# Patient Record
Sex: FEMALE | Race: WHITE | NOT HISPANIC OR LATINO | Employment: OTHER | URBAN - METROPOLITAN AREA
[De-identification: names, ages, dates, MRNs, and addresses within clinical notes are randomized per-mention and may not be internally consistent; named-entity substitution may affect disease eponyms.]

---

## 2021-11-09 PROBLEM — K35.32 ACUTE PERFORATED APPENDICITIS: Status: ACTIVE | Noted: 2021-11-09

## 2021-11-09 PROBLEM — K37 APPENDICITIS: Status: ACTIVE | Noted: 2021-11-09

## 2023-10-20 ENCOUNTER — HOSPITAL ENCOUNTER (INPATIENT)
Facility: HOSPITAL | Age: 73
LOS: 2 days | Discharge: HOME | DRG: 064 | End: 2023-10-22
Attending: EMERGENCY MEDICINE | Admitting: PSYCHIATRY & NEUROLOGY
Payer: MEDICARE

## 2023-10-20 DIAGNOSIS — I61.2 NONTRAUMATIC HEMORRHAGE OF LEFT CEREBRAL HEMISPHERE: Primary | ICD-10-CM

## 2023-10-20 DIAGNOSIS — I48.91 ATRIAL FIBRILLATION: ICD-10-CM

## 2023-10-20 PROBLEM — G93.6 VASOGENIC CEREBRAL EDEMA: Status: ACTIVE | Noted: 2023-10-20

## 2023-10-20 PROBLEM — I61.9 ICH (INTRACEREBRAL HEMORRHAGE): Status: ACTIVE | Noted: 2023-10-20

## 2023-10-20 PROBLEM — G93.9 TEMPORAL LOBE LESION: Status: ACTIVE | Noted: 2023-10-20

## 2023-10-20 PROBLEM — I61.1 NONTRAUMATIC CORTICAL HEMORRHAGE OF LEFT CEREBRAL HEMISPHERE: Status: ACTIVE | Noted: 2023-10-20

## 2023-10-20 LAB
ABO + RH BLD: NORMAL
BLD GP AB SCN CELLS X3 SERPL QL: NORMAL
BLD PROD TYP BPU: NORMAL
BLOOD UNIT EXPIRATION DATE: NORMAL
BLOOD UNIT TYPE CODE: 7300
BLOOD UNIT TYPE: NORMAL
CHOLEST SERPL-MCNC: 154 MG/DL (ref 120–199)
CHOLEST SERPL-MCNC: 154 MG/DL (ref 120–199)
CHOLEST/HDLC SERPL: 3 {RATIO} (ref 2–5)
CHOLEST/HDLC SERPL: 3 {RATIO} (ref 2–5)
CODING SYSTEM: NORMAL
CROSSMATCH INTERPRETATION: NORMAL
DISPENSE STATUS: NORMAL
ESTIMATED AVG GLUCOSE: 108 MG/DL (ref 68–131)
ESTIMATED AVG GLUCOSE: 108 MG/DL (ref 68–131)
HBA1C MFR BLD: 5.4 % (ref 4–5.6)
HBA1C MFR BLD: 5.4 % (ref 4–5.6)
HDLC SERPL-MCNC: 52 MG/DL (ref 40–75)
HDLC SERPL-MCNC: 52 MG/DL (ref 40–75)
HDLC SERPL: 33.8 % (ref 20–50)
HDLC SERPL: 33.8 % (ref 20–50)
LDLC SERPL CALC-MCNC: 87.4 MG/DL (ref 63–159)
LDLC SERPL CALC-MCNC: 87.4 MG/DL (ref 63–159)
NONHDLC SERPL-MCNC: 102 MG/DL
NONHDLC SERPL-MCNC: 102 MG/DL
SPECIMEN OUTDATE: NORMAL
TRIGL SERPL-MCNC: 73 MG/DL (ref 30–150)
TRIGL SERPL-MCNC: 73 MG/DL (ref 30–150)
TSH SERPL DL<=0.005 MIU/L-ACNC: 1.67 UIU/ML (ref 0.4–4)
TSH SERPL DL<=0.005 MIU/L-ACNC: 1.67 UIU/ML (ref 0.4–4)
UNIT NUMBER: NORMAL

## 2023-10-20 PROCEDURE — 25000003 PHARM REV CODE 250: Performed by: PHYSICIAN ASSISTANT

## 2023-10-20 PROCEDURE — P9035 PLATELET PHERES LEUKOREDUCED: HCPCS | Performed by: PHYSICIAN ASSISTANT

## 2023-10-20 PROCEDURE — 86901 BLOOD TYPING SEROLOGIC RH(D): CPT | Performed by: EMERGENCY MEDICINE

## 2023-10-20 PROCEDURE — 25000003 PHARM REV CODE 250

## 2023-10-20 PROCEDURE — 99285 EMERGENCY DEPT VISIT HI MDM: CPT | Mod: 25

## 2023-10-20 PROCEDURE — 99223 1ST HOSP IP/OBS HIGH 75: CPT | Mod: ,,, | Performed by: STUDENT IN AN ORGANIZED HEALTH CARE EDUCATION/TRAINING PROGRAM

## 2023-10-20 PROCEDURE — 99223 PR INITIAL HOSPITAL CARE,LEVL III: ICD-10-PCS | Mod: ,,, | Performed by: STUDENT IN AN ORGANIZED HEALTH CARE EDUCATION/TRAINING PROGRAM

## 2023-10-20 PROCEDURE — 80061 LIPID PANEL: CPT

## 2023-10-20 PROCEDURE — 83036 HEMOGLOBIN GLYCOSYLATED A1C: CPT

## 2023-10-20 PROCEDURE — 99223 PR INITIAL HOSPITAL CARE,LEVL III: ICD-10-PCS | Mod: ,,, | Performed by: PSYCHIATRY & NEUROLOGY

## 2023-10-20 PROCEDURE — 84443 ASSAY THYROID STIM HORMONE: CPT

## 2023-10-20 PROCEDURE — 63600175 PHARM REV CODE 636 W HCPCS: Mod: JG | Performed by: PHYSICIAN ASSISTANT

## 2023-10-20 PROCEDURE — 20000000 HC ICU ROOM

## 2023-10-20 PROCEDURE — 99223 1ST HOSP IP/OBS HIGH 75: CPT | Mod: ,,, | Performed by: PSYCHIATRY & NEUROLOGY

## 2023-10-20 PROCEDURE — 96365 THER/PROPH/DIAG IV INF INIT: CPT

## 2023-10-20 PROCEDURE — 36430 TRANSFUSION BLD/BLD COMPNT: CPT

## 2023-10-20 PROCEDURE — 25500020 PHARM REV CODE 255: Performed by: PSYCHIATRY & NEUROLOGY

## 2023-10-20 PROCEDURE — 63600175 PHARM REV CODE 636 W HCPCS: Performed by: PHYSICIAN ASSISTANT

## 2023-10-20 RX ORDER — LABETALOL HCL 20 MG/4 ML
10 SYRINGE (ML) INTRAVENOUS
Status: DISCONTINUED | OUTPATIENT
Start: 2023-10-20 | End: 2023-10-21

## 2023-10-20 RX ORDER — POLYETHYLENE GLYCOL 3350 17 G/17G
17 POWDER, FOR SOLUTION ORAL DAILY
Status: DISCONTINUED | OUTPATIENT
Start: 2023-10-21 | End: 2023-10-22 | Stop reason: HOSPADM

## 2023-10-20 RX ORDER — NICARDIPINE HYDROCHLORIDE 0.2 MG/ML
0-15 INJECTION INTRAVENOUS CONTINUOUS
Status: DISCONTINUED | OUTPATIENT
Start: 2023-10-20 | End: 2023-10-21

## 2023-10-20 RX ORDER — ONDANSETRON 2 MG/ML
4 INJECTION INTRAMUSCULAR; INTRAVENOUS EVERY 8 HOURS PRN
Status: DISCONTINUED | OUTPATIENT
Start: 2023-10-20 | End: 2023-10-22 | Stop reason: HOSPADM

## 2023-10-20 RX ORDER — SODIUM,POTASSIUM PHOSPHATES 280-250MG
2 POWDER IN PACKET (EA) ORAL
Status: DISCONTINUED | OUTPATIENT
Start: 2023-10-20 | End: 2023-10-22 | Stop reason: HOSPADM

## 2023-10-20 RX ORDER — ACETAMINOPHEN 325 MG/1
650 TABLET ORAL EVERY 6 HOURS PRN
Status: DISCONTINUED | OUTPATIENT
Start: 2023-10-20 | End: 2023-10-22 | Stop reason: HOSPADM

## 2023-10-20 RX ORDER — LANOLIN ALCOHOL/MO/W.PET/CERES
800 CREAM (GRAM) TOPICAL
Status: DISCONTINUED | OUTPATIENT
Start: 2023-10-20 | End: 2023-10-22 | Stop reason: HOSPADM

## 2023-10-20 RX ORDER — SODIUM CHLORIDE 9 MG/ML
INJECTION, SOLUTION INTRAVENOUS CONTINUOUS
Status: DISCONTINUED | OUTPATIENT
Start: 2023-10-20 | End: 2023-10-21

## 2023-10-20 RX ORDER — AMOXICILLIN 250 MG
1 CAPSULE ORAL 2 TIMES DAILY
Status: DISCONTINUED | OUTPATIENT
Start: 2023-10-20 | End: 2023-10-22 | Stop reason: HOSPADM

## 2023-10-20 RX ORDER — LEVETIRACETAM 500 MG/5ML
500 INJECTION, SOLUTION, CONCENTRATE INTRAVENOUS EVERY 12 HOURS
Status: DISCONTINUED | OUTPATIENT
Start: 2023-10-20 | End: 2023-10-21

## 2023-10-20 RX ORDER — HYDROCODONE BITARTRATE AND ACETAMINOPHEN 500; 5 MG/1; MG/1
TABLET ORAL
Status: DISCONTINUED | OUTPATIENT
Start: 2023-10-20 | End: 2023-10-22 | Stop reason: HOSPADM

## 2023-10-20 RX ORDER — SODIUM CHLORIDE 0.9 % (FLUSH) 0.9 %
10 SYRINGE (ML) INJECTION
Status: DISCONTINUED | OUTPATIENT
Start: 2023-10-20 | End: 2023-10-22 | Stop reason: HOSPADM

## 2023-10-20 RX ADMIN — SODIUM CHLORIDE 500 ML: 9 INJECTION, SOLUTION INTRAVENOUS at 05:10

## 2023-10-20 RX ADMIN — ACETAMINOPHEN 650 MG: 325 TABLET ORAL at 09:10

## 2023-10-20 RX ADMIN — LEVETIRACETAM 500 MG: 100 INJECTION INTRAVENOUS at 09:10

## 2023-10-20 RX ADMIN — DESMOPRESSIN ACETATE 22.4 MCG: 4 SOLUTION INTRAVENOUS at 03:10

## 2023-10-20 RX ADMIN — SODIUM CHLORIDE: 0.9 INJECTION, SOLUTION INTRAVENOUS at 07:10

## 2023-10-20 RX ADMIN — SENNOSIDES AND DOCUSATE SODIUM 1 TABLET: 50; 8.6 TABLET ORAL at 09:10

## 2023-10-20 RX ADMIN — IOHEXOL 75 ML: 350 INJECTION, SOLUTION INTRAVENOUS at 05:10

## 2023-10-20 NOTE — HPI
Jody Muñoz is a 73 y.o. patient with past medical history of hypothyroidism on synthroid and on aspirin without clear medical indication. Presented to [facility] for a 2-day history of expressive aphasia and persistent pressure-like headaches and recent memory problems. Patient was unable to write or recall her family members names. As per  at bedside, no trauma or fall. Patient denies visual disturbances or weakness. CT head with posterior aspect of the left temporal lobe consistent with intraparenchymal hemorrhage and surrounding edema. No midline shift. Vital signs stable upon admission. On physical exam patient with expressive aphasia. Strength 5/5, no drift. CTA and brain MRI upon admission pending.    Patient will be admitted to Neuro ICU for further management and treatment.

## 2023-10-20 NOTE — SUBJECTIVE & OBJECTIVE
Past Medical History:   Diagnosis Date    Temporal lobe lesion 10/20/2023    Thyroid disease      Past Surgical History:   Procedure Laterality Date    C SECTION      LAPAROSCOPIC APPENDECTOMY N/A 11/9/2021    Procedure: APPENDECTOMY, LAPAROSCOPIC;  Surgeon: Donald James MD;  Location: [facility];  Service: General;  Laterality: N/A;      No current facility-administered medications on file prior to encounter.     Current Outpatient Medications on File Prior to Encounter   Medication Sig Dispense Refill    aspirin (ECOTRIN) 81 MG EC tablet Take 81 mg by mouth once daily.      levothyroxine (SYNTHROID) 50 MCG tablet Take 50 mcg by mouth before breakfast.      [DISCONTINUED] ciprofloxacin HCl (CIPRO) 500 MG tablet Take 1 tablet (500 mg total) by mouth 2 (two) times daily. 20 tablet 0    [DISCONTINUED] HYDROcodone-acetaminophen (NORCO) 7.5-325 mg per tablet Take 1 tablet by mouth every 4 (four) hours as needed. 41 tablet 0      Allergies: Patient has no known allergies.    N/A  Family history is reviewed and has not changed   Social History     Tobacco Use    Smoking status: Never    Smokeless tobacco: Never   Substance Use Topics    Alcohol use: Not Currently    Drug use: Never     Review of Systems   Constitutional:  Negative for activity change and appetite change.   HENT:  Negative for drooling and trouble swallowing.    Eyes:  Negative for photophobia.   Respiratory:  Negative for cough and shortness of breath.    Cardiovascular:  Negative for chest pain and leg swelling.   Gastrointestinal:  Negative for abdominal pain and constipation.   Endocrine: Negative for cold intolerance and heat intolerance.   Genitourinary:  Negative for dysuria and hematuria.   Musculoskeletal:  Negative for arthralgias and myalgias.   Neurological:  Negative for tremors, seizures, weakness and numbness.   Psychiatric/Behavioral:  Negative for confusion.      Objective:     Vitals:    Temp: 97.6 °F (36.4 °C)  Pulse: 85  BP:  125/63  MAP (mmHg): 88  Resp: 16  SpO2: 98 %    Temp  Min: 97.6 °F (36.4 °C)  Max: 98.4 °F (36.9 °C)  Pulse  Min: 77  Max: 87  BP  Min: 121/69  Max: 148/76  MAP (mmHg)  Min: 86  Max: 104  Resp  Min: 16  Max: 19  SpO2  Min: 96 %  Max: 99 %    No intake/output data recorded.            Physical Exam  Vitals reviewed.   HENT:      Head: Normocephalic and atraumatic.   Eyes:      Pupils: Pupils are equal, round, and reactive to light.   Cardiovascular:      Rate and Rhythm: Normal rate and regular rhythm.   Pulmonary:      Effort: Pulmonary effort is normal.      Breath sounds: Normal breath sounds.   Abdominal:      Palpations: Abdomen is soft.   Musculoskeletal:      Cervical back: Normal range of motion.   Skin:     General: Skin is warm.      Capillary Refill: Capillary refill takes 2 to 3 seconds.   Neurological:      Mental Status: She is alert.      Cranial Nerves: Cranial nerves 2-12 are intact.      Sensory: Sensation is intact.      Motor: Motor function is intact.      Coordination: Coordination is intact.      Deep Tendon Reflexes: Babinski sign absent on the right side. Babinski sign absent on the left side.      Reflex Scores:       Bicep reflexes are 2+ on the right side and 2+ on the left side.       Patellar reflexes are 2+ on the right side and 2+ on the left side.           Today I personally reviewed pertinent medications, and imaging with L ICH.

## 2023-10-20 NOTE — PROGRESS NOTES
Patient arrived to [facility] from  ED-->[facility] ED-->[facility] [id number]     Report received from: ED RN,      Type of stroke/diagnosis:  ICH    Current symptoms: expressive aphasia, follows commands    Skin Assessment done: Yes  Wounds noted: none  *If wounds noted, was Wound Care consulted? N/a  *If wounds noted, LDA placed? N/a  Skin Assessment Verified by:  SHAYY Calles Completed? pending    Patient Belongings on Admit: shorts, gold watch, glasses, gold ring, rosary    NCC notified: Dr. Pepper

## 2023-10-20 NOTE — ASSESSMENT & PLAN NOTE
-Area of cerebral edema identified when reviewing brain imaging in the Left PCA territory, there is associated localized mass effect without midline shift  -Admitted to [facility]. NSGY consulted and following, no acute surgical intervention at this time.   -Frequent q1hr neuro checks as any acute changes or worsening neuro status may signify expansion of the insult and/or area of the edema, which may require acute interventions to prevent loss of function and/or death.  -Obtain stat CTH for any new or worsening neuro changes and notify primary team immediately

## 2023-10-20 NOTE — H&P
[facility] - Emergency Dept  Neurocritical Care  History & Physical    Admit Date: 10/20/2023  Service Date: 10/20/2023  Length of Stay: 0    Subjective:     Chief Complaint: ICH (intracerebral hemorrhage)    History of Present Illness: Jody Muñoz is a 73 y.o. patient with past medical history of hypothyroidism on synthroid and on aspirin without clear medical indication. Presented to [facility] for a 2-day history of expressive aphasia and persistent pressure-like headaches and recent memory problems. Patient was unable to write or recall her family members names. As per  at bedside, no trauma or fall. Patient denies visual disturbances or weakness. CT head with posterior aspect of the left temporal lobe consistent with intraparenchymal hemorrhage and surrounding edema. No midline shift. Vital signs stable upon admission. On physical exam patient with expressive aphasia. Strength 5/5, no drift. CTA and brain MRI upon admission pending.    Patient will be admitted to Neuro ICU for further management and treatment.                      Past Medical History:   Diagnosis Date    Temporal lobe lesion 10/20/2023    Thyroid disease      Past Surgical History:   Procedure Laterality Date    C SECTION      LAPAROSCOPIC APPENDECTOMY N/A 11/9/2021    Procedure: APPENDECTOMY, LAPAROSCOPIC;  Surgeon: Donald James MD;  Location: [facility] OR;  Service: General;  Laterality: N/A;      No current facility-administered medications on file prior to encounter.     Current Outpatient Medications on File Prior to Encounter   Medication Sig Dispense Refill    aspirin (ECOTRIN) 81 MG EC tablet Take 81 mg by mouth once daily.      levothyroxine (SYNTHROID) 50 MCG tablet Take 50 mcg by mouth before breakfast.      [DISCONTINUED] ciprofloxacin HCl (CIPRO) 500 MG tablet Take 1 tablet (500 mg total) by mouth 2 (two) times daily. 20 tablet 0    [DISCONTINUED] HYDROcodone-acetaminophen (NORCO) 7.5-325 mg per tablet  Take 1 tablet by mouth every 4 (four) hours as needed. 41 tablet 0      Allergies: Patient has no known allergies.    N/A  Family history is reviewed and has not changed   Social History     Tobacco Use    Smoking status: Never    Smokeless tobacco: Never   Substance Use Topics    Alcohol use: Not Currently    Drug use: Never     Review of Systems   Constitutional:  Negative for activity change and appetite change.   HENT:  Negative for drooling and trouble swallowing.    Eyes:  Negative for photophobia.   Respiratory:  Negative for cough and shortness of breath.    Cardiovascular:  Negative for chest pain and leg swelling.   Gastrointestinal:  Negative for abdominal pain and constipation.   Endocrine: Negative for cold intolerance and heat intolerance.   Genitourinary:  Negative for dysuria and hematuria.   Musculoskeletal:  Negative for arthralgias and myalgias.   Neurological:  Negative for tremors, seizures, weakness and numbness.   Psychiatric/Behavioral:  Negative for confusion.      Objective:     Vitals:    Temp: 97.6 °F (36.4 °C)  Pulse: 85  BP: 125/63  MAP (mmHg): 88  Resp: 16  SpO2: 98 %    Temp  Min: 97.6 °F (36.4 °C)  Max: 98.4 °F (36.9 °C)  Pulse  Min: 77  Max: 87  BP  Min: 121/69  Max: 148/76  MAP (mmHg)  Min: 86  Max: 104  Resp  Min: 16  Max: 19  SpO2  Min: 96 %  Max: 99 %    No intake/output data recorded.            Physical Exam  Vitals reviewed.   HENT:      Head: Normocephalic and atraumatic.   Eyes:      Pupils: Pupils are equal, round, and reactive to light.   Cardiovascular:      Rate and Rhythm: Normal rate and regular rhythm.   Pulmonary:      Effort: Pulmonary effort is normal.      Breath sounds: Normal breath sounds.   Abdominal:      Palpations: Abdomen is soft.   Musculoskeletal:      Cervical back: Normal range of motion.   Skin:     General: Skin is warm.      Capillary Refill: Capillary refill takes 2 to 3 seconds.   Neurological:      Mental Status: She is alert.      Cranial  Nerves: Cranial nerves 2-12 are intact.      Sensory: Sensation is intact.      Motor: Motor function is intact.      Coordination: Coordination is intact.      Deep Tendon Reflexes: Babinski sign absent on the right side. Babinski sign absent on the left side.      Reflex Scores:       Bicep reflexes are 2+ on the right side and 2+ on the left side.       Patellar reflexes are 2+ on the right side and 2+ on the left side.           Today I personally reviewed pertinent medications, and imaging with L ICH.       Assessment/Plan:     Neuro  * ICH (intracerebral hemorrhage)  Patient with 2-day sudden expressive aphasia without weakness or any other major neurological symptom. CTH on admission suggestive of L ICH.    Plan   - Admit to NCC  - NPO   - DDAVP once as per nsg  - cardene gtt ordered from ED  - Keppra 1g followed by 500 BID   - platelets for reversal as per nsg  - hold aspirin   - pending repeat neuroimages (CT, MRI)   - SBP < 140  - Q1 neurochecks   - Vascular Neurology consulted  - PT/OT/SLP               The patient is being Prophylaxed for:  Venous Thromboembolism with: None  Stress Ulcer with: None  Ventilator Pneumonia with: not applicable    N/A  Family history is reviewed and has not changed     Activity Orders          Turn patient starting at 10/20 1800    Elevate HOB starting at 10/20 1617    Diet NPO: NPO starting at 10/20 1617        Full Code    Antonio Brito MD  Neurocritical Care  [facility] - Emergency Dept

## 2023-10-20 NOTE — HPI
Jody Muñoz is a 73 y.o. female with PMH of hypothyroidism, on daily ASA for unclear reason that presents as a transfer from [place] for higher level care of L temporal ICH. Patient initially presented to OSH with progressively worsening expressive aphasia x 2 days as well as pressure-like HA. Denies associated N/V, vision changes, extremity weakness, gait instability. CTH @ OSH with L temporal IPH vs mass with surrounding edema. At [facility], neuro exam significant for expressive aphasia with NIHSS 2. NSGY consulted, no acute surgical intervention at this time and recommended DDAVP/Plts for reversal of ASA. Patient will be admitted to [facility] for close monitoring and observation.

## 2023-10-20 NOTE — ED PROVIDER NOTES
Encounter Date: 10/20/2023       History     Chief Complaint   Patient presents with    Transfer     Transfer from [facility] for further eval of ICH     HPI  Review of patient's allergies indicates:  No Known Allergies  Past Medical History:   Diagnosis Date    Temporal lobe lesion 10/20/2023    Thyroid disease      Past Surgical History:   Procedure Laterality Date    C SECTION      LAPAROSCOPIC APPENDECTOMY N/A 11/9/2021    Procedure: APPENDECTOMY, LAPAROSCOPIC;  Surgeon: Donald James MD;  Location: [facility] OR;  Service: General;  Laterality: N/A;     History reviewed. No pertinent family history.  Social History     Tobacco Use    Smoking status: Never    Smokeless tobacco: Never   Substance Use Topics    Alcohol use: Not Currently    Drug use: Never     Review of Systems    Physical Exam     Initial Vitals [10/20/23 1259]   BP Pulse Resp Temp SpO2   126/85 79 19 97.6 °F (36.4 °C) 98 %      MAP       --         Physical Exam    ED Course   Procedures  Labs Reviewed   URINALYSIS, REFLEX TO URINE CULTURE   TYPE & SCREEN          Imaging Results              MRI Brain W WO Contrast (Final result)  Result time 10/21/23 07:41:56      Final result by Xander Hairston MD (10/21/23 07:41:56)                   Impression:      1. When compared to prior CT/CTA imaging of 10/20/2023, relatively similar appearance of recent appearing parenchymal hemorrhage centered in the posterior left temporal lobe, measuring on the order of 1.7 x 2.2 x 2.5 cm (AP x ML x CC).No definite pathologic appearing masslike or nodular enhancement in the region of the hematoma to suggest an underlying mass lesion or vascular anomaly.  Noting this, due to the possibility of compression of an underlying lesion on the current examination, follow-up contrast enhanced examination upon resolution of the acute hematoma would be recommended.  2. Small volume left posterior convexity subdural and subarachnoid blood.  Questionable small volume bilateral  high frontoparietal subarachnoid blood.      Electronically signed by: Xander Hairston  Date:    10/21/2023  Time:    07:41               Narrative:    EXAMINATION:  MRI BRAIN W WO CONTRAST    CLINICAL HISTORY:  Metastatic disease evaluation;ICH eval underlying lesion;    TECHNIQUE:  Multiplanar multisequence MR imaging of the brain was performed before and after the administration of 6 mL Gadavist intravenous contrast.    COMPARISON:  CTA head neck 10/20/2023.    FINDINGS:  Parenchyma: There is no restricted diffusion to suggest acute or subacute ischemic infarct.When compared to prior CT/CTA imaging of 10/20/2023, relatively similar appearance of recent appearing parenchymal hemorrhage centered in the posterior left temporal lobe, measuring on the order of 1.7 x 2.2 x 2.5 cm (AP x ML x CC).    Additionally noted is a thin posterior convexity subdural hematoma measuring on the order of 0.2-0.3 cm.  Small volume dependent subarachnoid blood is additionally noted in this region.    Additionally question small volume bilateral posterior frontal/anterior parietal subarachnoid blood, near the vertex (series 13, image 22 of the T2 * GRE sequence)    No definite masslike or nodular postcontrast enhancement is noted within or surrounding this parenchymal hemorrhage.  Mildly prominent serpiginous enhancement may be vascular/reactive in etiology.    No definite additional enhancing lesions are seen elsewhere.  Age-related parenchymal volume loss is noted.  Few nonspecific areas of T2/FLAIR hyperintense signal without enhancement in the white matter may indicate sequela of chronic small vessel ischemic disease.    Additional comments: There is no midline shift.  The basal cisterns are patent.    Ventricles: Normal.    Flow voids: The normal major intracranial arterial flow voids are visualized.    Enhancement: As above.    Sinuses and mastoid air cells: Scattered relatively modest paranasal sinus mucosal thickening with a few  retention cysts.    Orbits: Normal    Midline structures: The pituitary and craniocervical junction are normal.    Marrow: Normal                                       CTA STROKE MULTI-PHASE (Final result)  Result time 10/20/23 18:38:27   Procedure changed from CTA Head and Neck (xpd)     Final result by Tyrell Miller MD (10/20/23 18:38:27)                   Impression:      Stable left temporal lobe intraparenchymal hemorrhage with minimal mass effect.  No detrimental interval change.    No significant abnormality of major cervical and head arteries.    Nodular left thyroid gland with heterogeneous enhancement.  Recommend thyroid ultrasound for further evaluation.    Bilateral upper lobe several scattered pulmonary micro nodules ranging 2-4 mm.  For multiple solid nodules all <6 mm, Fleischner Society 2017 guidelines recommend no routine follow up for a low risk patient, or follow up with non-contrast chest CT at 12 months after discovery in a high risk patient.    Electronically signed by resident: Katherine Toth  Date:    10/20/2023  Time:    17:09    Electronically signed by: Tyrell Miller MD  Date:    10/20/2023  Time:    18:38               Narrative:    EXAMINATION:  CTA STROKE MULTI-PHASE    CLINICAL HISTORY:  Stroke, hemorrhagic;with venous phase;    TECHNIQUE:  Axial CT images obtained throughout the region of the head before and after the administration of intravenous contrast.  CT angiogram was performed through the cervical and intracranial vasculature during the IV bolus administration of 75mL of Omnipaque 350.  Two additional phases through the intracranial vasculature via multiphase technique.  Multiplanar MPR and MIP reformats were performed.    CT source data was analyzed using artificial intelligence software for detection of a large vessel occlusions (LVO) in order to enable computer assisted triage notification and aid clinical stroke decision making.    COMPARISON:  CT head  10/20/2023    FINDINGS:  The ventricles are normal in size without evidence of hydrocephalus.    Redemonstration of posterior left temporal lobe hyperattenuating mass with surrounding vasogenic edema consistent with intraparenchymal hemorrhage.  It measures 2.3 x 1.4 cm similar to earlier today.  Stable appearance of adjacent sulci effacement from earlier today.  No new parenchymal mass effect, new hemorrhage, new edema or major vascular distribution infarct.    No extra-axial blood or fluid collections.    The cranium appears intact.  Left posterior ethmoidal cells and right inferior maxillary sinus mucosal thickening.  Mastoid air cells and the remainder of paranasal sinuses are essentially clear.  No significant abnormality of intraorbital component or extracranial head soft tissues.    Nodular left thyroid gland with heterogeneous enhancement.  Bilateral parotid glands and submandibular glands are unremarkable.  Nasopharynx, oropharynx, hypopharynx and larynx are unremarkable.  No cervical lymphadenopathy.  Visualized portion of heart is unremarkable.  Minimal biapical pleuroparenchymal scarring with minimal dependent atelectasis of the imaged lungs.  Several scattered solid pulmonary micro nodules ranging 2-4 mm throughout the bilateral upper lobes.      CTA:    The aortic arch maintains a normal branching pattern.    The common and internal carotid arteries are normal in course and caliber. No significant stenosis in either carotid bifurcation.    The vertebral origins are patent.  The cervical vertebral arteries are normal in course and caliber.  Vertebrobasilar system is within normal limits without focal abnormality.    The anterior, middle, and posterior cerebral arteries are within normal limits, without evidence of significant stenosis, focal occlusion, arteriovenous malformation or intracranial aneurysm formation.                                       Medications   0.9%  NaCl infusion (for blood  administration) (has no administration in time range)   sodium chloride 0.9% flush 10 mL (has no administration in time range)   potassium bicarbonate disintegrating tablet 50 mEq (has no administration in time range)   potassium bicarbonate disintegrating tablet 35 mEq (has no administration in time range)   potassium bicarbonate disintegrating tablet 60 mEq (has no administration in time range)   magnesium oxide tablet 800 mg (800 mg Oral Given 10/21/23 0952)   magnesium oxide tablet 800 mg ( Oral Canceled Entry 10/21/23 0949)   potassium, sodium phosphates 280-160-250 mg packet 2 packet (has no administration in time range)   potassium, sodium phosphates 280-160-250 mg packet 2 packet (has no administration in time range)   potassium, sodium phosphates 280-160-250 mg packet 2 packet (has no administration in time range)   polyethylene glycol packet 17 g (17 g Oral Not Given 10/21/23 0900)   senna-docusate 8.6-50 mg per tablet 1 tablet (1 tablet Oral Not Given 10/21/23 2001)   ondansetron injection 4 mg (has no administration in time range)   acetaminophen tablet 650 mg (650 mg Oral Given 10/20/23 2129)   labetalol 20 mg/4 mL (5 mg/mL) IV syring (has no administration in time range)   desmopressin (DDAVP) 22.4 mcg in sodium chloride 0.9% 50 mL IVPB (0 mcg Intravenous Stopped 10/20/23 1613)   sodium chloride 0.9% bolus 500 mL 500 mL (0 mLs Intravenous Stopped 10/20/23 1841)   iohexoL (OMNIPAQUE 350) injection 75 mL (75 mLs Intravenous Given 10/20/23 1705)   gadobutroL (GADAVIST) injection 6 mL (6 mLs Intravenous Given 10/21/23 0152)     Medical Decision Making  Amount and/or Complexity of Data Reviewed  Radiology: ordered.  ECG/medicine tests:  Decision-making details documented in ED Course.    Risk  Decision regarding hospitalization.              Attending Attestation:             Attending ED Notes:   Attending Note:  I have seen the patient, have repeated the key portions of the history and physical, reviewed  and agree with the medical documentation, and supervised and managed the medical care of the patient. Additionally, I was present for the critical portion of any procedure(s) performed.    73 female history above transferred for acute intracranial hemorrhage.  Had difficulty writing a check 2 days ago.  At 3:00 a.m. noticed that she was having difficulty thinking of her grandchildren's birthday with expressive aphasia      ED Course as of 10/21/23 2122   Fri Oct 20, 2023   1424 73-year-old female in no acute distress.  Patient's blood pressure is in the 120 systolic, aside from expressive aphasia and memory problems, patient does not have any sensory or motor deficits on exam.  CTA ordered per neuro critical care's recommendations I contacted Dr. Pennington and notified him that the patient was here. [BP]   1425 EKG 12-lead  Previous EKG reviewed [BP]   1539 Neuro critical care came to evaluate the patient, ordered additional imaging and will admit the patient for blood pressure management and close monitoring. [BP]      ED Course User Index  [BP] Kin Wright MD                    Clinical Impression:   Final diagnoses:  [I61.2] Nontraumatic hemorrhage of left cerebral hemisphere (Primary)        ED Disposition Condition    Admit Stable                Rama Siddiqui MD  10/21/23 2122

## 2023-10-20 NOTE — CONSULTS
"[facility] - [facility] Dept  Neurosurgery  Consult Note    Inpatient consult to Neurosurgery  Consult performed by: Mi Knowles PA-C  Consult ordered by: Kin Wright MD        Subjective:     Chief Complaint/Reason for Admission: Expressive aphasia    History of Present Illness: Jody Muñoz is a 74yo woman w/PMHx hypothyroidism, on ASA daily for unclear indication, presenting w/2 days of worsening expressive aphasia. She also describes a "pressure-like" sensation in her head. Her symptoms became apparent when she found herself unable to write a check or state the dates of birth of her children then became progressively noticeable to her . Denies nausea, vomiting, visual changes, weakness/numbness of the extremities or gait instability. CTH revealed L temporal IPH vs mass with surrounding edema. Neurosurgery consulted for input.       (Not in a hospital admission)      Review of patient's allergies indicates:  No Known Allergies    Past Medical History:   Diagnosis Date    Thyroid disease      Past Surgical History:   Procedure Laterality Date    C SECTION      LAPAROSCOPIC APPENDECTOMY N/A 11/9/2021    Procedure: APPENDECTOMY, LAPAROSCOPIC;  Surgeon: Donald James MD;  [facility];  Service: General;  Laterality: N/A;     Family History    None       Tobacco Use    Smoking status: Never    Smokeless tobacco: Never   Substance and Sexual Activity    Alcohol use: Not Currently    Drug use: Never    Sexual activity: Not on file     Review of Systems  Objective:     Weight: 56 kg (123 lb 7.3 oz)  Body mass index is 21.87 kg/m².  Vital Signs (Most Recent):  Temp: 97.6 °F (36.4 °C) (10/20/23 1259)  Pulse: 79 (10/20/23 1259)  Resp: 19 (10/20/23 1259)  BP: 126/85 (10/20/23 1259)  SpO2: 98 % (10/20/23 1259) Vital Signs (24h Range):  Temp:  [97.6 °F (36.4 °C)-98.4 °F (36.9 °C)] 97.6 °F (36.4 °C)  Pulse:  [79-87] 79  Resp:  [17-19] 19  SpO2:  [96 %-99 %] 98 %  BP: (122-148)/(67-85) 126/85 "       Physical Exam:   General: well developed, well nourished, no distress.   Head: normocephalic, atraumatic  Neurologic: Alert and oriented. Thought content appropriate.  GCS: Motor: 6/Verbal: 5/Eyes: 4 GCS Total: 15  Mental Status: Awake, Alert, Oriented x2 (struggled to name the year)  Speech: word-finding difficulty and intermittent slurred speech   Cranial nerves: face symmetric, CN II-XII grossly intact.   Eyes: pupils equal, round, reactive to light , EOMI.   Sensory: intact to light touch throughout    Motor Strength:Moves all extremities spontaneously with good tone.  Full strength upper and lower extremities. No abnormal movements seen.     Strength  Deltoids Triceps Biceps Wrist Extension Wrist Flexion Hand    Upper: R 5/5 5/5 5/5 5/5 5/5 5/5    L 5/5 5/5 5/5 5/5 5/5 5/5     Iliopsoas Quadriceps Knee  Flexion Tibialis  anterior Gastro- cnemius EHL   Lower: R 5/5 5/5 5/5 5/5 5/5 5/5    L 5/5 5/5 5/5 5/5 5/5 5/5     Pronator Drift: no drift noted  Finger-to-nose: Intact bilaterally    Significant Labs:  Recent Labs   Lab 10/20/23  1043   *      K 4.2      CO2 28   BUN 19*   CREATININE 1.01   CALCIUM 9.3   MG 2.0     Recent Labs   Lab 10/20/23  1043   WBC 7.30   HGB 13.9   HCT 41.8        Recent Labs   Lab 10/20/23  1043   LABPT 12.8   INR 1.0     Microbiology Results (last 7 days)       ** No results found for the last 168 hours. **          All pertinent labs from the last 24 hours have been reviewed.    Significant Diagnostics:  I have reviewed all pertinent imaging results/findings within the past 24 hours.    Assessment/Plan:     * Temporal lobe lesion  72yo W w/hypothyroidism, on daily ASA (no clear indication) presenting w/worsening aphasia x 2 days. No other focal deficits on exam. No acute neurosurgical intervention indicated but will follow along closely.     - CTH with L temporal lesions c/f intraparenchymal hemorrhage vs mass with surrounding edema  - Recommend  admission to Neuro ICU  - HOLD aspirin, give DDAVP and platelets for reversal   - Repeat CTH due at 17:00  - Please also obtain MRI brain with and without contrast  - Keppra 1g followed by 500mg BID  - SBP < 140  - Q1 hr neuro checks        Thank you for your consult. I will follow-up with patient. Please contact us if you have any additional questions.    Mi Knowles PA-C  Neurosurgery  [facility] - Emergency Dept

## 2023-10-20 NOTE — HPI
"Jody Muñoz is a 72yo woman w/PMHx hypothyroidism, on ASA daily for unclear indication, presenting w/2 days of worsening expressive aphasia. She also describes a "pressure-like" sensation in her head. Her symptoms became apparent when she found herself unable to write a check or state the dates of birth of her children then became progressively noticeable to her . Denies nausea, vomiting, visual changes, weakness/numbness of the extremities or gait instability. CTH revealed L temporal IPH vs mass with surrounding edema. Neurosurgery consulted for input.   "

## 2023-10-20 NOTE — ASSESSMENT & PLAN NOTE
72yo W w/hypothyroidism, on daily ASA (no clear indication) presenting w/worsening aphasia x 2 days. No other focal deficits on exam. No acute neurosurgical intervention indicated but will follow along closely.     - CTH with L temporal lesions c/f intraparenchymal hemorrhage vs mass with surrounding edema  - Recommend admission to Neuro ICU  - HOLD aspirin, give DDAVP and platelets for reversal   - Repeat CTH due at 17:00  - Please also obtain MRI brain with and without contrast  - Keppra 1g followed by 500mg BID  - SBP < 140  - Q1 hr neuro checks

## 2023-10-20 NOTE — ASSESSMENT & PLAN NOTE
Jody Muñoz is a 73 y.o. female with PMH of hypothyroidism, on daily ASA for unclear reason that presents as a transfer from [facility] for higher level care of L temporal ICH. Patient initially presented with progressively worsening expressive aphasia x 2 days as well as pressure-like HA. CTH revealed L temporal IPH with surrounding edema, underlying mass not excluded. [place] [facility] [place] for expressive aphasia with NIHSS 2.     NSGY consulted, no acute surgical intervention at this time and recommended DDAVP/Plts for reversal of ASA. Patient will be admitted to [facility] for close monitoring and observation.      Antithrombotics for secondary stroke prevention: Antiplatelets: None: Intracerebral Hemorrhage    Statins for secondary stroke prevention: Statins: None: Reason: ICH    Aggressive risk factor modification: Diet, None     Rehab efforts: The patient has been evaluated by a stroke team provider and the therapy needs have been fully considered based off the presenting complaints and exam findings. The following therapy evaluations are needed: PT evaluate and treat, OT evaluate and treat, SLP evaluate and treat    Diagnostics ordered/pending: HgbA1C to assess blood glucose levels, Lipid Profile to assess cholesterol levels, MRI head without contrast to assess brain parenchyma    VTE prophylaxis: Mechanical prophylaxis: Place SCDs  None: Reason for No Pharmacological VTE Prophylaxis: ICH    BP parameters: ICH: SBP <140

## 2023-10-20 NOTE — SUBJECTIVE & OBJECTIVE
(Not in a hospital admission)      Review of patient's allergies indicates:  No Known Allergies    Past Medical History:   Diagnosis Date    Thyroid disease      Past Surgical History:   Procedure Laterality Date    C SECTION      LAPAROSCOPIC APPENDECTOMY N/A 11/9/2021    Procedure: APPENDECTOMY, LAPAROSCOPIC;  Surgeon: Donald James MD;  Location: [facility];  Service: General;  Laterality: N/A;     Family History    None       Tobacco Use    Smoking status: Never    Smokeless tobacco: Never   Substance and Sexual Activity    Alcohol use: Not Currently    Drug use: Never    Sexual activity: Not on file     Review of Systems  Objective:     Weight: 56 kg (123 lb 7.3 oz)  Body mass index is 21.87 kg/m².  Vital Signs (Most Recent):  Temp: 97.6 °F (36.4 °C) (10/20/23 1259)  Pulse: 79 (10/20/23 1259)  Resp: 19 (10/20/23 1259)  BP: 126/85 (10/20/23 1259)  SpO2: 98 % (10/20/23 1259) Vital Signs (24h Range):  Temp:  [97.6 °F (36.4 °C)-98.4 °F (36.9 °C)] 97.6 °F (36.4 °C)  Pulse:  [79-87] 79  Resp:  [17-19] 19  SpO2:  [96 %-99 %] 98 %  BP: (122-148)/(67-85) 126/85       Physical Exam:   General: well developed, well nourished, no distress.   Head: normocephalic, atraumatic  Neurologic: Alert and oriented. Thought content appropriate.  GCS: Motor: 6/Verbal: 5/Eyes: 4 GCS Total: 15  Mental Status: Awake, Alert, Oriented x2 (struggled to name the year)  Cranial nerves: face symmetric, CN II-XII grossly intact.   Eyes: pupils equal, round, reactive to light , EOMI.   Sensory: intact to light touch throughout    Motor Strength:Moves all extremities spontaneously with good tone.  Full strength upper and lower extremities. No abnormal movements seen.     Strength  Deltoids Triceps Biceps Wrist Extension Wrist Flexion Hand    Upper: R 5/5 5/5 5/5 5/5 5/5 5/5    L 5/5 5/5 5/5 5/5 5/5 5/5     Iliopsoas Quadriceps Knee  Flexion Tibialis  anterior Gastro- cnemius EHL   Lower: R 5/5 5/5 5/5 5/5 5/5 5/5    L 5/5 5/5 5/5 5/5 5/5 5/5      Pronator Drift: no drift noted  Finger-to-nose: Intact bilaterally    Significant Labs:  Recent Labs   Lab 10/20/23  1043   *      K 4.2      CO2 28   BUN 19*   CREATININE 1.01   CALCIUM 9.3   MG 2.0     Recent Labs   Lab 10/20/23  1043   WBC 7.30   HGB 13.9   HCT 41.8        Recent Labs   Lab 10/20/23  1043   LABPT 12.8   INR 1.0     Microbiology Results (last 7 days)       ** No results found for the last 168 hours. **          All pertinent labs from the last 24 hours have been reviewed.    Significant Diagnostics:  I have reviewed all pertinent imaging results/findings within the past 24 hours.

## 2023-10-20 NOTE — SUBJECTIVE & OBJECTIVE
Past Medical History:   Diagnosis Date    Temporal lobe lesion 10/20/2023    Thyroid disease      Past Surgical History:   Procedure Laterality Date    C SECTION      LAPAROSCOPIC APPENDECTOMY N/A 11/9/2021    Procedure: APPENDECTOMY, LAPAROSCOPIC;  Surgeon: Donald James MD;  [facility];  Service: General;  Laterality: N/A;       History reviewed. No pertinent family history.    Social History     Tobacco Use    Smoking status: Never    Smokeless tobacco: Never   Substance Use Topics    Alcohol use: Not Currently    Drug use: Never     Review of patient's allergies indicates:  No Known Allergies    Medications: I have reviewed the current medication administration record.    (Not in a hospital admission)      Review of Systems   Constitutional:  Negative for fatigue.   HENT:  Negative for drooling and trouble swallowing.    Eyes:  Negative for visual disturbance.   Respiratory:  Negative for choking.    Cardiovascular:  Negative for palpitations.   Gastrointestinal:  Negative for nausea and vomiting.   Musculoskeletal:  Negative for neck stiffness.   Skin:  Negative for color change.   Neurological:  Positive for speech difficulty and headaches. Negative for weakness.   Psychiatric/Behavioral:  Negative for confusion.    All other systems reviewed and are negative.    Objective:     Vital Signs (Most Recent):  Temp: 97.6 °F (36.4 °C) (10/20/23 1259)  Pulse: 94 (10/20/23 1629)  Resp: 16 (10/20/23 1559)  BP: 137/65 (10/20/23 1629)  SpO2: 99 % (10/20/23 1629)    Vital Signs Range (Last 24H):  Temp:  [97.6 °F (36.4 °C)-98.4 °F (36.9 °C)]   Pulse:  [77-94]   Resp:  [16-19]   BP: (121-148)/(63-85)   SpO2:  [96 %-99 %]        Physical Exam  Vitals and nursing note reviewed.   Constitutional:       General: She is not in acute distress.  HENT:      Head: Normocephalic.      Nose: Nose normal.      Mouth/Throat:      Pharynx: No oropharyngeal exudate or posterior oropharyngeal erythema.   Eyes:       "Extraocular Movements: Extraocular movements intact.      Conjunctiva/sclera: Conjunctivae normal.   Cardiovascular:      Rate and Rhythm: Normal rate.   Pulmonary:      Effort: Pulmonary effort is normal. No respiratory distress.   Abdominal:      General: There is no distension.   Musculoskeletal:         General: No deformity or signs of injury.      Cervical back: Normal range of motion. No rigidity.   Skin:     General: Skin is warm and dry.   Neurological:      Mental Status: She is alert.      Cranial Nerves: No dysarthria or facial asymmetry.      Sensory: Sensation is intact.      Motor: No weakness, tremor or seizure activity.      Coordination: Coordination is intact.      Comments: Mod-severe expressive aphasia. Able to follow commands, moves all extremities spontaneously, no gaze deviation/vision deficits appreciated.   Psychiatric:         Attention and Perception: Attention normal.         Behavior: Behavior normal. Behavior is cooperative.              Neurological Exam:   LOC: alert  Attention Span: Good   Language: Expressive aphasia  Articulation: No dysarthria  Orientation: Untestable due to severe aphasia   Visual Fields: Full  EOM (CN III, IV, VI): Full/intact  Facial Movement (CN VII): Symmetric facial expression    Motor: Arm left  Normal 5/5  Leg left  Normal 5/5  Arm right  Normal 5/5  Leg right Normal 5/5  Sensation: Intact to light touch      Laboratory:  CMP:   Recent Labs   Lab 10/20/23  1043   CALCIUM 9.3   ALBUMIN 4.3   PROT 8.4*      K 4.2   CO2 28      BUN 19*   CREATININE 1.01   ALKPHOS 80   ALT 22   AST 39*   BILITOT 2.1*     CBC:   Recent Labs   Lab 10/20/23  1043   WBC 7.30   RBC 4.57   HGB 13.9   HCT 41.8      MCV 91   MCH 30.3   MCHC 33.1     Lipid Panel: No results for input(s): "CHOL", "LDLCALC", "HDL", "TRIG" in the last 168 hours.  Coagulation:   Recent Labs   Lab 10/20/23  1043   INR 1.0     Hgb A1C: No results for input(s): "HGBA1C" in the last 168 " "hours.  TSH: No results for input(s): "TSH" in the last 168 hours.    Diagnostic Results:      Brain Imaging:  CTH without contrast 10/20/2023 @ OSH  Impression:  Findings within the posterior aspect of the left temporal lobe most consistent with intraparenchymal hemorrhage with surrounding edema.  No midline shift.  This finding is located in the periphery of the cerebrum.  Sequela of a venous infarct is considered in the differential.  Underlying mass not excluded.      Vessel Imaging:  CTA head and neck pending     "

## 2023-10-20 NOTE — ASSESSMENT & PLAN NOTE
Patient with 2-day sudden expressive aphasia without weakness or any other major neurological symptom. CTH on admission suggestive of L ICH.    Plan   - Admit to [facility]  - NPO   - DDAVP once as per nsg  - cardene gtt ordered from ED  - Keppra 1g followed by 500 BID   - platelets for reversal as per nsg  - hold aspirin   - pending repeat neuroimages (CT, MRI)   - SBP < 140  - Q1 neurochecks   - Vascular Neurology consulted  - PT/OT/SLP

## 2023-10-20 NOTE — CONSULTS
[facility] - Neuro Critical Care  Vascular Neurology  [facility]  Consult Note    Inpatient consult to Vascular (Stroke) Neurology  Consult performed by: Anjana Hatch PA-C  Consult ordered by: Antonio Brito MD        Assessment/Plan:     Patient is a 73 y.o. year old female with:    * Nontraumatic cortical hemorrhage of left cerebral hemisphere  Jody Muñoz is a 73 y.o. female with PMH of hypothyroidism, on daily ASA for unclear reason that presents as a transfer from [place] for higher level care of L temporal ICH. Patient initially presented with progressively worsening expressive aphasia x 2 days as well as pressure-like HA. CTH revealed L temporal IPH with surrounding edema, underlying mass not excluded. At [facility] neuro exam significant for expressive aphasia with NIHSS 2.     NSGY consulted, no acute surgical intervention at this time and recommended DDAVP/Plts for reversal of ASA. Patient [facility] be admitted to [facility] for close monitoring and observation.      Antithrombotics for secondary stroke prevention: Antiplatelets: None: Intracerebral Hemorrhage    Statins for secondary stroke prevention: Statins: None: Reason: ICH    Aggressive risk factor modification: Diet, None     Rehab efforts: The patient has been evaluated by a stroke team provider and the therapy needs have been fully considered based off the presenting complaints and exam findings. The following therapy evaluations are needed: PT evaluate and treat, OT evaluate and treat, SLP evaluate and treat    Diagnostics ordered/pending: HgbA1C to assess blood glucose levels, Lipid Profile to assess cholesterol levels, MRI head without contrast to assess brain parenchyma    VTE prophylaxis: Mechanical prophylaxis: Place SCDs  None: Reason for No Pharmacological VTE Prophylaxis: ICH    BP parameters: ICH: SBP <140        Vasogenic cerebral edema  -Area of cerebral edema identified when reviewing brain imaging in the Left PCA  territory, there is associated localized mass effect without midline shift  -Admitted to [facility]. NSGY consulted and following, no acute surgical intervention at this time.   -Frequent q1hr neuro checks as any acute changes or worsening neuro status may signify expansion of the insult and/or area of the edema, which may require acute interventions to prevent loss of function and/or death.  -Obtain stat CTH for any new or worsening neuro changes and notify primary team immediately        STROKE DOCUMENTATION          NIH Scale:  1a. Level of Consciousness: 0-->Alert, keenly responsive  1b. LOC Questions: 0-->Answers both questions correctly  1c. LOC Commands: 0-->Performs both tasks correctly  2. Best Gaze: 0-->Normal  3. Visual: 0-->No visual loss  4. Facial Palsy: 0-->Normal symmetrical movements  5a. Motor Arm, Left: 0-->No drift, limb holds 90 (or 45) degrees for full 10 secs  5b. Motor Arm, Right: 0-->No drift, limb holds 90 (or 45) degrees for full 10 secs  6a. Motor Leg, Left: 0-->No drift, leg holds 30 degree position for full 5 secs  6b. Motor Leg, Right: 0-->No drift, leg holds 30 degree position for full 5 secs  7. Limb Ataxia: 0-->Absent  8. Sensory: 0-->Normal, no sensory loss  9. Best Language: 2-->Severe aphasia, all communication is through fragmentary expression, great need for inference, questioning, and guessing by the listener. Range of information that can be exchanged is limited, listener carries burden of. . . (see row details)  10. Dysarthria: 0-->Normal  11. Extinction and Inattention (formerly Neglect): 0-->No abnormality  Total (NIH Stroke Scale): 2    Modified Flower Score: 0  [place] Coma Scale:    ABCD2 Score:    [id number] Score:   HAS -BLED Score:   ICH Score:0  Hunt & Grace Classification:       Thrombolysis Candidate? No, CT findings (ICH, SAH, Large core infarct)     Delays to Thrombolysis?  Not Applicable    Interventional Revascularization Candidate?   Is the patient eligible for  mechanical endovascular reperfusion (JAVIER)?  No; at this time symptoms not suggestive of large vessel occlusion    Delays to Thrombectomy? Not Applicable    Hemorrhagic change of an Ischemic Stroke: Does this patient have an ischemic stroke with hemorrhagic changes? No     Subjective:     History of Present Illness:  Jody Muñoz is a 73 y.o. female with PMH of hypothyroidism, on daily ASA for unclear reason that presents as a transfer from [place] for higher level care of L temporal ICH. Patient initially presented to OSH with progressively worsening expressive aphasia x 2 days as well as pressure-like HA. Denies associated N/V, vision changes, extremity weakness, gait instability. CTH @ OSH with L temporal IPH vs mass with surrounding edema. At [facility], neuro exam significant for expressive aphasia with NIHSS 2. NSGY consulted, no acute surgical intervention at this time and recommended DDAVP/Plts for reversal of ASA. Patient will be admitted to [facility] for close monitoring and observation.            Past Medical History:   Diagnosis Date    Temporal lobe lesion 10/20/2023    Thyroid disease      Past Surgical History:   Procedure Laterality Date    C SECTION      LAPAROSCOPIC APPENDECTOMY N/A 11/9/2021    Procedure: APPENDECTOMY, LAPAROSCOPIC;  Surgeon: Donald James MD;  Location: [facility] OR;  Service: General;  Laterality: N/A;       History reviewed. No pertinent family history.    Social History     Tobacco Use    Smoking status: Never    Smokeless tobacco: Never   Substance Use Topics    Alcohol use: Not Currently    Drug use: Never     Review of patient's allergies indicates:  No Known Allergies    Medications: I have reviewed the current medication administration record.    (Not in a hospital admission)      Review of Systems   Constitutional:  Negative for fatigue.   HENT:  Negative for drooling and trouble swallowing.    Eyes:  Negative for visual disturbance.   Respiratory:  Negative for  choking.    Cardiovascular:  Negative for palpitations.   Gastrointestinal:  Negative for nausea and vomiting.   Musculoskeletal:  Negative for neck stiffness.   Skin:  Negative for color change.   Neurological:  Positive for speech difficulty and headaches. Negative for weakness.   Psychiatric/Behavioral:  Negative for confusion.    All other systems reviewed and are negative.    Objective:     Vital Signs (Most Recent):  Temp: 97.6 °F (36.4 °C) (10/20/23 1259)  Pulse: 94 (10/20/23 1629)  Resp: 16 (10/20/23 1559)  BP: 137/65 (10/20/23 1629)  SpO2: 99 % (10/20/23 1629)    Vital Signs Range (Last 24H):  Temp:  [97.6 °F (36.4 °C)-98.4 °F (36.9 °C)]   Pulse:  [77-94]   Resp:  [16-19]   BP: (121-148)/(63-85)   SpO2:  [96 %-99 %]        Physical Exam  Vitals and nursing note reviewed.   Constitutional:       General: She is not in acute distress.  HENT:      Head: Normocephalic.      Nose: Nose normal.      Mouth/Throat:      Pharynx: No oropharyngeal exudate or posterior oropharyngeal erythema.   Eyes:      Extraocular Movements: Extraocular movements intact.      Conjunctiva/sclera: Conjunctivae normal.   Cardiovascular:      Rate and Rhythm: Normal rate.   Pulmonary:      Effort: Pulmonary effort is normal. No respiratory distress.   Abdominal:      General: There is no distension.   Musculoskeletal:         General: No deformity or signs of injury.      Cervical back: Normal range of motion. No rigidity.   Skin:     General: Skin is warm and dry.   Neurological:      Mental Status: She is alert.      Cranial Nerves: No dysarthria or facial asymmetry.      Sensory: Sensation is intact.      Motor: No weakness, tremor or seizure activity.      Coordination: Coordination is intact.      Comments: Mod-severe expressive aphasia. Able to follow commands, moves all extremities spontaneously, no gaze deviation/vision deficits appreciated.   Psychiatric:         Attention and Perception: Attention normal.         Behavior:  "Behavior normal. Behavior is cooperative.              Neurological Exam:   LOC: alert  Attention Span: Good   Language: Expressive aphasia  Articulation: No dysarthria  Orientation: Untestable due to severe aphasia   Visual Fields: Full  EOM (CN III, IV, VI): Full/intact  Facial Movement (CN VII): Symmetric facial expression    Motor: Arm left  Normal 5/5  Leg left  Normal 5/5  Arm right  Normal 5/5  Leg right Normal 5/5  Sensation: Intact to light touch      Laboratory:  CMP:   Recent Labs   Lab 10/20/23  1043   CALCIUM 9.3   ALBUMIN 4.3   PROT 8.4*      K 4.2   CO2 28      BUN 19*   CREATININE 1.01   ALKPHOS 80   ALT 22   AST 39*   BILITOT 2.1*     CBC:   Recent Labs   Lab 10/20/23  1043   WBC 7.30   RBC 4.57   HGB 13.9   HCT 41.8      MCV 91   MCH 30.3   MCHC 33.1     Lipid Panel: No results for input(s): "CHOL", "LDLCALC", "HDL", "TRIG" in the last 168 hours.  Coagulation:   Recent Labs   Lab 10/20/23  1043   INR 1.0     Hgb A1C: No results for input(s): "HGBA1C" in the last 168 hours.  TSH: No results for input(s): "TSH" in the last 168 hours.    Diagnostic Results:      Brain Imaging:  CTH without contrast 10/20/2023 @ OSH  Impression:  Findings within the posterior aspect of the left temporal lobe most consistent with intraparenchymal hemorrhage with surrounding edema.  No midline shift.  This finding is located in the periphery of the cerebrum.  Sequela of a venous infarct is considered in the differential.  Underlying mass not excluded.      Vessel Imaging:  CTA head and neck pending         Anjana Hatch PA-C  Comprehensive Stroke Center  Department of Vascular Neurology   [facility] - Neuro Critical Care   "

## 2023-10-20 NOTE — ED PROVIDER NOTES
Encounter Date: 10/20/2023       History     Chief Complaint   Patient presents with    Transfer     Transfer from [facility] for further eval of ICH     73-year-old female with a past medical history of thyroid disease is a transfer from [facility] for intracranial hemorrhage.  The patient's only presenting symptom is expressive aphasia that she was reportedly been having for the last 2 days.    The history is provided by the patient. The history is limited by the condition of the patient. No  was used.     Review of patient's allergies indicates:  No Known Allergies  Past Medical History:   Diagnosis Date    Temporal lobe lesion 10/20/2023    Thyroid disease      Past Surgical History:   Procedure Laterality Date    C SECTION      LAPAROSCOPIC APPENDECTOMY N/A 11/9/2021    Procedure: APPENDECTOMY, LAPAROSCOPIC;  Surgeon: Donald James MD;  Location: [facility] OR;  Service: General;  Laterality: N/A;     No family history on file.  Social History     Tobacco Use    Smoking status: Never    Smokeless tobacco: Never   Substance Use Topics    Alcohol use: Not Currently    Drug use: Never     Review of Systems    Physical Exam     Initial Vitals [10/20/23 1259]   BP Pulse Resp Temp SpO2   126/85 79 19 97.6 °F (36.4 °C) 98 %      MAP       --         Physical Exam    Nursing note and vitals reviewed.  Constitutional: She appears well-developed and well-nourished. She is not diaphoretic. No distress.   HENT:   Head: Normocephalic and atraumatic.   Eyes: EOM are normal. Pupils are equal, round, and reactive to light.   Neck: Neck supple.   Normal range of motion.  Cardiovascular:  Normal rate, regular rhythm, normal heart sounds and intact distal pulses.           Pulmonary/Chest: Breath sounds normal. She has no wheezes. She has no rhonchi. She has no rales.   Abdominal: Abdomen is soft. There is no abdominal tenderness. There is no rebound and no guarding.   Musculoskeletal:         General: No tenderness  or edema. Normal range of motion.      Cervical back: Normal range of motion and neck supple.     Neurological: She is alert. She has normal strength.   Patient is alert and following commands, cranial nerves 2-12 grossly intact, sensation and strength intact and equal bilaterally in the upper and lower extremities    Speech is clear, however patient has difficulty relaying her history   Skin: Skin is warm and dry. Capillary refill takes less than 2 seconds. No rash noted. No erythema. No pallor.   Psychiatric: Her behavior is normal. Thought content normal.   The patient does not have any dysarthria         ED Course   Procedures  Labs Reviewed   HIV 1 / 2 ANTIBODY   HEPATITIS C ANTIBODY   TYPE & SCREEN   PREPARE PLATELETS (DOSE) SOFT          Imaging Results    None          Medications   levETIRAcetam injection 500 mg (has no administration in time range)   desmopressin (DDAVP) 22.4 mcg in sodium chloride 0.9% 50 mL IVPB (has no administration in time range)   0.9%  NaCl infusion (for blood administration) (has no administration in time range)   niCARdipine 40 mg/200 mL (0.2 mg/mL) infusion (0 mg/hr Intravenous Hold 10/20/23 1515)     Medical Decision Making  See ED course for remainder of care    Amount and/or Complexity of Data Reviewed  Radiology: ordered.  ECG/medicine tests:  Decision-making details documented in ED Course.    Risk  Prescription drug management.  Decision regarding hospitalization.              Attending Attestation:             Attending ED Notes:   Attending Note:  I have seen the patient, have repeated the key portions of the history and physical, reviewed and agree with the medical documentation, and supervised and managed the medical care of the patient. Additionally, I was present for the critical portion of any procedure(s) performed.    73 female history above transferred for acute intracranial hemorrhage.  Had difficulty writing a check 2 days ago.  At 3:00 a.m. noticed that she was  having difficulty thinking of her grandchildren's birthday with expressive aphasia.    VSS, continued expressive aphasia- otherwise neuro intact    NSY consulted, recommending MRI and admission to [facility].      Ordered eriberto Siddiqui MD  Staff ED Physician    Critical Care time:35 minutes inclusive of direct patient care, review of previous records, interpretation of labs, imaging and ekg, as well as discussion of my impression and plan of care with the patient, family and other clinicians/consultants. This time is exclusive of any separate billable procedures and of treating other patients.                       ED Course as of 10/20/23 1541   Fri Oct 20, 2023   1424 73-year-old female in no acute distress.  Patient's blood pressure is in the 120 systolic, aside from expressive aphasia and memory problems, patient does not have any sensory or motor deficits on exam.  CTA ordered per neuro critical care's recommendations I contacted Dr. Pennington and notified him that the patient was here. [BP]   1425 EKG 12-lead  Previous EKG reviewed [BP]   1539 Neuro critical care came to evaluate the patient, ordered additional imaging and will admit the patient for blood pressure management and close monitoring. [BP]      ED Course User Index  [BP] Kin Wright MD                    Clinical Impression:   Final diagnoses:  [I61.2] Nontraumatic hemorrhage of left cerebral hemisphere (Primary)        ED Disposition Condition    Admit Stable                Kin Wright MD  Resident  10/20/23 1541       Rama Siddiqui MD  10/21/23 2124

## 2023-10-21 PROBLEM — R91.8 PULMONARY NODULES: Status: ACTIVE | Noted: 2023-10-21

## 2023-10-21 LAB
ALBUMIN SERPL BCP-MCNC: 3.2 G/DL (ref 3.5–5.2)
ALP SERPL-CCNC: 70 U/L (ref 55–135)
ALT SERPL W/O P-5'-P-CCNC: 11 U/L (ref 10–44)
ANION GAP SERPL CALC-SCNC: 10 MMOL/L (ref 8–16)
APTT PPP: 23.8 SEC (ref 21–32)
AST SERPL-CCNC: 17 U/L (ref 10–40)
BASOPHILS # BLD AUTO: 0.01 K/UL (ref 0–0.2)
BASOPHILS NFR BLD: 0.2 % (ref 0–1.9)
BILIRUB SERPL-MCNC: 2.2 MG/DL (ref 0.1–1)
BUN SERPL-MCNC: 18 MG/DL (ref 8–23)
CALCIUM SERPL-MCNC: 8.5 MG/DL (ref 8.7–10.5)
CHLORIDE SERPL-SCNC: 109 MMOL/L (ref 95–110)
CO2 SERPL-SCNC: 20 MMOL/L (ref 23–29)
CREAT SERPL-MCNC: 0.8 MG/DL (ref 0.5–1.4)
DIFFERENTIAL METHOD: ABNORMAL
EOSINOPHIL # BLD AUTO: 0 K/UL (ref 0–0.5)
EOSINOPHIL NFR BLD: 0.7 % (ref 0–8)
ERYTHROCYTE [DISTWIDTH] IN BLOOD BY AUTOMATED COUNT: 13.7 % (ref 11.5–14.5)
EST. GFR  (NO RACE VARIABLE): >60 ML/MIN/1.73 M^2
GLUCOSE SERPL-MCNC: 76 MG/DL (ref 70–110)
HCT VFR BLD AUTO: 31.9 % (ref 37–48.5)
HGB BLD-MCNC: 10.5 G/DL (ref 12–16)
IMM GRANULOCYTES # BLD AUTO: 0.01 K/UL (ref 0–0.04)
IMM GRANULOCYTES NFR BLD AUTO: 0.2 % (ref 0–0.5)
INR PPP: 1 (ref 0.8–1.2)
LYMPHOCYTES # BLD AUTO: 1 K/UL (ref 1–4.8)
LYMPHOCYTES NFR BLD: 18.3 % (ref 18–48)
MAGNESIUM SERPL-MCNC: 1.7 MG/DL (ref 1.6–2.6)
MCH RBC QN AUTO: 30.8 PG (ref 27–31)
MCHC RBC AUTO-ENTMCNC: 32.9 G/DL (ref 32–36)
MCV RBC AUTO: 94 FL (ref 82–98)
MONOCYTES # BLD AUTO: 0.3 K/UL (ref 0.3–1)
MONOCYTES NFR BLD: 5.6 % (ref 4–15)
NEUTROPHILS # BLD AUTO: 4.1 K/UL (ref 1.8–7.7)
NEUTROPHILS NFR BLD: 75 % (ref 38–73)
NRBC BLD-RTO: 0 /100 WBC
PHOSPHATE SERPL-MCNC: 2.7 MG/DL (ref 2.7–4.5)
PLATELET # BLD AUTO: 111 K/UL (ref 150–450)
PMV BLD AUTO: 10.3 FL (ref 9.2–12.9)
POCT GLUCOSE: 87 MG/DL (ref 70–110)
POTASSIUM SERPL-SCNC: 3.8 MMOL/L (ref 3.5–5.1)
PROT SERPL-MCNC: 6.4 G/DL (ref 6–8.4)
PROTHROMBIN TIME: 10.8 SEC (ref 9–12.5)
RBC # BLD AUTO: 3.41 M/UL (ref 4–5.4)
SODIUM SERPL-SCNC: 139 MMOL/L (ref 136–145)
TROPONIN I SERPL DL<=0.01 NG/ML-MCNC: 0.01 NG/ML (ref 0–0.03)
WBC # BLD AUTO: 5.4 K/UL (ref 3.9–12.7)

## 2023-10-21 PROCEDURE — 99233 PR SUBSEQUENT HOSPITAL CARE,LEVL III: ICD-10-PCS | Mod: ,,, | Performed by: PSYCHIATRY & NEUROLOGY

## 2023-10-21 PROCEDURE — 63600175 PHARM REV CODE 636 W HCPCS: Performed by: PHYSICIAN ASSISTANT

## 2023-10-21 PROCEDURE — 85610 PROTHROMBIN TIME: CPT

## 2023-10-21 PROCEDURE — 94761 N-INVAS EAR/PLS OXIMETRY MLT: CPT

## 2023-10-21 PROCEDURE — 92610 EVALUATE SWALLOWING FUNCTION: CPT

## 2023-10-21 PROCEDURE — 84484 ASSAY OF TROPONIN QUANT: CPT

## 2023-10-21 PROCEDURE — 97129 THER IVNTJ 1ST 15 MIN: CPT

## 2023-10-21 PROCEDURE — A9585 GADOBUTROL INJECTION: HCPCS | Performed by: PSYCHIATRY & NEUROLOGY

## 2023-10-21 PROCEDURE — 11000001 HC ACUTE MED/SURG PRIVATE ROOM

## 2023-10-21 PROCEDURE — 83735 ASSAY OF MAGNESIUM: CPT

## 2023-10-21 PROCEDURE — 99233 SBSQ HOSP IP/OBS HIGH 50: CPT | Mod: ,,, | Performed by: NEUROLOGICAL SURGERY

## 2023-10-21 PROCEDURE — 85025 COMPLETE CBC W/AUTO DIFF WBC: CPT

## 2023-10-21 PROCEDURE — 97530 THERAPEUTIC ACTIVITIES: CPT

## 2023-10-21 PROCEDURE — 99233 PR SUBSEQUENT HOSPITAL CARE,LEVL III: ICD-10-PCS | Mod: ,,, | Performed by: NEUROLOGICAL SURGERY

## 2023-10-21 PROCEDURE — 25500020 PHARM REV CODE 255: Performed by: PSYCHIATRY & NEUROLOGY

## 2023-10-21 PROCEDURE — 80053 COMPREHEN METABOLIC PANEL: CPT

## 2023-10-21 PROCEDURE — 84100 ASSAY OF PHOSPHORUS: CPT

## 2023-10-21 PROCEDURE — 85730 THROMBOPLASTIN TIME PARTIAL: CPT

## 2023-10-21 PROCEDURE — 25000003 PHARM REV CODE 250

## 2023-10-21 PROCEDURE — 97161 PT EVAL LOW COMPLEX 20 MIN: CPT

## 2023-10-21 PROCEDURE — 97165 OT EVAL LOW COMPLEX 30 MIN: CPT

## 2023-10-21 PROCEDURE — 99233 SBSQ HOSP IP/OBS HIGH 50: CPT | Mod: ,,, | Performed by: PSYCHIATRY & NEUROLOGY

## 2023-10-21 RX ORDER — GADOBUTROL 604.72 MG/ML
6 INJECTION INTRAVENOUS
Status: COMPLETED | OUTPATIENT
Start: 2023-10-21 | End: 2023-10-21

## 2023-10-21 RX ORDER — LABETALOL HCL 20 MG/4 ML
10 SYRINGE (ML) INTRAVENOUS
Status: DISCONTINUED | OUTPATIENT
Start: 2023-10-21 | End: 2023-10-22 | Stop reason: HOSPADM

## 2023-10-21 RX ADMIN — Medication 800 MG: at 06:10

## 2023-10-21 RX ADMIN — Medication 800 MG: at 09:10

## 2023-10-21 RX ADMIN — LEVETIRACETAM 500 MG: 100 INJECTION INTRAVENOUS at 08:10

## 2023-10-21 RX ADMIN — GADOBUTROL 6 ML: 604.72 INJECTION INTRAVENOUS at 01:10

## 2023-10-21 NOTE — PLAN OF CARE
OT evaluation completed.  Problem: Occupational Therapy  Goal: Occupational Therapy Goal  Description: Goals set 10/21 to be addressed for 14 days with expiration date, 11/4:  Patient will increase functional independence with ADLs by performing:    Patient will demonstrate stand pivot transfers with modified independence.   Not met  Patient will demonstrate grooming while standing with modified independence assist.   Not met  Patient will demonstrate upper body dressing with modified independence while seated EOB.   Not met  Patient will demonstrate lower body dressing with modified independence while seated EOB.   Not met  Patient will demonstrate toileting with modified independence.   Not met  Patient will demonstrate bathing while seated EOB with modified independence.   Not met  Patient's family / caregiver will demonstrate independence and safety with assisting patient with self-care skills and functional mobility.     Not met  Patient and/or patient's family will verbalize understanding of stroke prevention guidelines, personal risk factors and stroke warning signs via teachback method.  Not met             Outcome: Ongoing, Progressing

## 2023-10-21 NOTE — ASSESSMENT & PLAN NOTE
Jody Muñoz is a 73 y.o. female with PMH of hypothyroidism, on daily ASA for unclear reason that presents as a transfer from [place] for higher level care of L temporal ICH. Patient initially presented with progressively worsening expressive aphasia x 2 days as well as pressure-like HA. CTH revealed L temporal IPH with surrounding edema, underlying mass not excluded. [place] [facility] [place] expressive aphasia with NIHSS 2.     NSGY consulted, no acute surgical intervention required, patient received DDAVP/Plts for reversal of ASA. Follow up MRI W/WO with evidence of convexal SAH suggestive of possible underlying CAA, will confirm with SWI imaging. Patient pending step down.      Antithrombotics for secondary stroke prevention: Antiplatelets: None: Intracerebral Hemorrhage    Statins for secondary stroke prevention: Statins: None: Reason: not indicated in acute ICH    Aggressive risk factor modification: Diet, None     Rehab efforts: The patient has been evaluated by a stroke team provider and the therapy needs have been fully considered based off the presenting complaints and exam findings. The following therapy evaluations are needed: PT evaluate and treat, OT evaluate and treat, SLP evaluate and treat    Diagnostics ordered/pending: Other: MRI with SWI only    VTE prophylaxis: Mechanical prophylaxis: Place SCDs  None: Reason for No Pharmacological VTE Prophylaxis: ICH    BP parameters: ICH: SBP <140

## 2023-10-21 NOTE — PT/OT/SLP EVAL
"Occupational Therapy   Evaluation    Name: Jody Muñoz  MRN: [id number]  Admitting Diagnosis: Nontraumatic cortical hemorrhage of left cerebral hemisphere  Recent Surgery: * No surgery found *      Recommendations:     Discharge Recommendations: Low Intensity Therapy  Discharge Equipment Recommendations:  none  Barriers to discharge:  None    Assessment:     Jody Muñoz is a 73 y.o. female with a medical diagnosis of Nontraumatic cortical hemorrhage of left cerebral hemisphere.  She presents with performance deficits affecting function: weakness, impaired self care skills, impaired functional mobility.      Rehab Prognosis: Good; patient would benefit from acute skilled OT services to address these deficits and reach maximum level of function.       Plan:     Patient to be seen 3 x/week to address the above listed problems via self-care/home management, therapeutic activities, therapeutic exercises, neuromuscular re-education  Plan of Care Expires: 11/18/23  Plan of Care Reviewed with: patient    Subjective     Patient:  "I wasn't remembering stuff and I was having trouble talking."     Occupational Profile:  Patient resides in [place] with her  in one story home with no steps to enter.  PTA patient independent with ADLs including driving.  Patient is left handed.  Retired: banking.  Hobbies: line dancing.    Pain/Comfort:  Pain Rating 1: 0/10  Pain Rating Post-Intervention 1: 0/10    Patients cultural, spiritual, Temple conflicts given the current situation: no    Objective:     Communicated with: Nurse prior to session.  Patient found supine with bed alarm, peripheral IV, telemetry, PureWick, SCD upon OT entry to room.    General Precautions: Standard, aspiration, fall  Orthopedic Precautions: N/A  Braces: N/A  Respiratory Status: Room air    Occupational Performance:    Bed Mobility:    Patient completed Rolling/Turning to Left with  modified independence  Patient completed " Rolling/Turning to Right with modified independence  Patient completed Supine to Sit with modified independence  Patient completed Sit to Supine with modified independence    Functional Mobility/Transfers:  Patient completed Sit <> Stand Transfer with supervision  with  no assistive device   Patient completed Bed <> Chair Transfer using Stand Pivot technique with supervision with no assistive device    Activities of Daily Living:  Grooming: supervision    Upper Body Dressing: supervision    Lower Body Dressing: supervision      Cognitive/Visual Perceptual:  Cognitive/Psychosocial Skills:     -       Oriented to: Person, Place, Time, and Situation   -       Follows Commands/attention:Follows one-step commands    Physical Exam:  Postural examination/scapula alignment:    -       Rounded shoulders  Upper Extremity Range of Motion:     -       Right Upper Extremity: WNL  -       Left Upper Extremity: WNL  Upper Extremity Strength:    -       Right Upper Extremity: WNL  -       Left Upper Extremity: WNL    AMPAC 6 Click ADL:  AMPAC Total Score: 18    Treatment & Education:  Patient/ Family education provided for stroke warning signs, prevention guidelines and personal risk factors.  Patient/ Family verbalizing understanding via teach back method.   Patient education provided on role of OT.  Continued education, patient/ family training recommended.  Patient alert and oriented x 3; able to follow 4/4 one step commands.  Patient attentive and interactive throughout the session.  Patient able to identify 5/5 body parts.  Able to name 5/5 objects.  Able to sequence 7/7 days of the week and 12/12 months of the year.  Session was conducted separately from PT session to give more opportunities to mobilize throughout the day.    Patient left supine with all lines intact, call button in reach, and bed alarm on    GOALS:   Multidisciplinary Problems       Occupational Therapy Goals          Problem: Occupational Therapy    Goal  Priority Disciplines Outcome Interventions   Occupational Therapy Goal     OT, PT/OT Ongoing, Progressing    Description: Goals set 10/21 to be addressed for 14 days with expiration date, 11/4:  Patient will increase functional independence with ADLs by performing:    Patient will demonstrate stand pivot transfers with modified independence.   Not met  Patient will demonstrate grooming while standing with modified independence assist.   Not met  Patient will demonstrate upper body dressing with modified independence while seated EOB.   Not met  Patient will demonstrate lower body dressing with modified independence while seated EOB.   Not met  Patient will demonstrate toileting with modified independence.   Not met  Patient will demonstrate bathing while seated EOB with modified independence.   Not met  Patient's family / caregiver will demonstrate independence and safety with assisting patient with self-care skills and functional mobility.     Not met  Patient and/or patient's family will verbalize understanding of stroke prevention guidelines, personal risk factors and stroke warning signs via teachback method.  Not met                                  History:     Past Medical History:   Diagnosis Date    Temporal lobe lesion 10/20/2023    Thyroid disease          Past Surgical History:   Procedure Laterality Date    C SECTION      LAPAROSCOPIC APPENDECTOMY N/A 11/9/2021    Procedure: APPENDECTOMY, LAPAROSCOPIC;  Surgeon: Donald James MD;  Location: [facility];  Service: General;  Laterality: N/A;       Time Tracking:     OT Date of Treatment: 10/21/23  OT Start Time: 0454  OT Stop Time: 0525  OT Total Time (min): 31 min    Billable Minutes:Evaluation 8  Therapeutic Activity 8  Cognitive Retraining 15    10/21/2023

## 2023-10-21 NOTE — PLAN OF CARE
PT Eval complete and POC established.    Paulina Li, PT, DPT  10/21/2023      Problem: Physical Therapy  Goal: Physical Therapy Goal  Description: Goals to be met by: 2023     Patient will increase functional independence with mobility by performin. Supine to sit with Wilson  2. Sit to supine with Wilson  3. Sit to stand transfer with Wilson  4. Bed to chair transfer with Wilson using No Assistive Device  5. Gait  x 200 feet with Wilson using No Assistive Device.   6. Lower extremity exercise program x10 reps per handout, with supervision    Outcome: Ongoing, Progressing

## 2023-10-21 NOTE — PLAN OF CARE
Safety measures maintained. Patient bed in low position. Bed alarm set. Patient call light with in reach. Patient belongings with in reach. VSS. Full assessment in chart.       Problem: Adult Inpatient Plan of Care  Goal: Plan of Care Review  Outcome: Ongoing, Progressing  Goal: Patient-Specific Goal (Individualized)  Outcome: Ongoing, Progressing  Goal: Absence of Hospital-Acquired Illness or Injury  Outcome: Ongoing, Progressing  Goal: Optimal Comfort and Wellbeing  Outcome: Ongoing, Progressing  Goal: Readiness for Transition of Care  Outcome: Ongoing, Progressing     Problem: Adjustment to Illness (Stroke, Hemorrhagic)  Goal: Optimal Coping  Outcome: Ongoing, Progressing     Problem: Bowel Elimination Impaired (Stroke, Hemorrhagic)  Goal: Effective Bowel Elimination  Outcome: Ongoing, Progressing     Problem: Cerebral Tissue Perfusion (Stroke, Hemorrhagic)  Goal: Optimal Cerebral Tissue Perfusion  Outcome: Ongoing, Progressing     Problem: Cognitive Impairment (Stroke, Hemorrhagic)  Goal: Optimal Cognitive Function  Outcome: Ongoing, Progressing     Problem: Communication Impairment (Stroke, Hemorrhagic)  Goal: Effective Communication Skills  Outcome: Ongoing, Progressing     Problem: Functional Ability Impaired (Stroke, Hemorrhagic)  Goal: Optimal Functional Ability  Outcome: Ongoing, Progressing     Problem: Pain (Stroke, Hemorrhagic)  Goal: Acceptable Pain Control  Outcome: Ongoing, Progressing     Problem: Respiratory Compromise (Stroke, Hemorrhagic)  Goal: Effective Oxygenation and Ventilation  Outcome: Ongoing, Progressing     Problem: Sensorimotor Impairment (Stroke, Hemorrhagic)  Goal: Improved Sensorimotor Function  Outcome: Ongoing, Progressing     Problem: Swallowing Impairment (Stroke, Hemorrhagic)  Goal: Optimal Eating and Swallowing Without Aspiration  Outcome: Ongoing, Progressing     Problem: Urinary Elimination Impaired (Stroke, Hemorrhagic)  Goal: Effective Urinary Elimination  Outcome:  Ongoing, Progressing

## 2023-10-21 NOTE — CONSULTS
Inpatient consult to Physical Medicine Rehab  Consult performed by: Chyna Lopez NP  Consult ordered by: Antonio Brito MD      Consult received.  Reviewed patient history and current admission.  PM&R following. Will follow up with pt once medically stable and able to participate with therapies.     Chyna Lopez NP  Physical Medicine & Rehabilitation   10/21/2023

## 2023-10-21 NOTE — ASSESSMENT & PLAN NOTE
74yo W w/hypothyroidism, on daily ASA (no clear indication) presenting w/worsening aphasia x 2 days. No other focal deficits on exam. No acute neurosurgical intervention indicated but will follow along closely.     - Admitted to NCC  - CTH with L temporal lesions c/f intraparenchymal hemorrhage vs mass with surrounding edema  - HOLD aspirin, give DDAVP and platelets for reversal   - Interval CTA w/o underlying lesion, stable hemorrhage  - MRI final read pending however appears negative for underlying mass  - Keppra 1g followed by 500mg BID  - SBP < 140  - Q1 hr neuro checks  - Care per Vascular Neurology/NCC, recommend interval MRI in 6 weeks at f/up    NSGY will sign off, please call w/questions

## 2023-10-21 NOTE — ASSESSMENT & PLAN NOTE
-Area of cerebral edema identified when reviewing brain imaging in the L temporal lobe  -minimal mass effect, no MLS  -exam and follow up imaging stable, patient stepping down to NPU, q4h neuro checks  -Obtain stat CTH for any new or worsening neuro changes and notify primary team immediately

## 2023-10-21 NOTE — PROVIDER PROGRESS NOTES - EMERGENCY DEPT.
Encounter Date: 10/20/2023    ED Physician Progress Notes        Physician Note:   Signout Note  I received signout from the previous providers.     Chief complaint:  Transfer (Transfer from [facility] for further eval of ICH)      Per sign out and chart review: Jody Muñoz is a 73 y.o. female transferred to [facility] ED for Neuro surgery evaluation given intracranial hemorrhage    During ED stay patient patient was stable but had aphasia.    Pt signed out to me pending:  Neuro critical care admission    Update/ Disposition:  On evaluation patient had ongoing aphasia but was stable.  Nicardipine started given blood pressure of systolic greater than 140.  Patient admitted to Neuro Critical Care    Patient, caregiver and/or family understands the plan and verbalized agreement. All questions answered.     Diagnostic Impression:    Nontraumatic hemorrhage of left cerebral hemisphere  (primary encounter diagnosis)  Atrial fibrillation     ED Disposition Condition    Admit Stable

## 2023-10-21 NOTE — SUBJECTIVE & OBJECTIVE
Interval History: NAEON. Denies headache this AM. Interval CTA with grossly stable hemorrhage w/o underlying vascular lesion. MRI appears w/o underlying mass however final read pending, will need interval MRI in 6 weeks.    Medications:  Continuous Infusions:   sodium chloride 0.9% 75 mL/hr at 10/21/23 0705    nicardipine Stopped (10/20/23 1515)     Scheduled Meds:   levETIRAcetam (Keppra) IV (PEDS and ADULTS)  500 mg Intravenous Q12H    polyethylene glycol  17 g Oral Daily    senna-docusate 8.6-50 mg  1 tablet Oral BID     PRN Meds:0.9%  NaCl infusion (for blood administration), acetaminophen, labetalol, magnesium oxide, magnesium oxide, ondansetron, potassium bicarbonate, potassium bicarbonate, potassium bicarbonate, potassium, sodium phosphates, potassium, sodium phosphates, potassium, sodium phosphates, sodium chloride 0.9%     Review of Systems  Objective:     Weight: 55.8 kg (123 lb)  Body mass index is 21.79 kg/m².  Vital Signs (Most Recent):  Temp: 98.3 °F (36.8 °C) (10/21/23 0705)  Pulse: 81 (10/21/23 0705)  Resp: 19 (10/21/23 0705)  BP: 129/60 (10/21/23 0705)  SpO2: 98 % (10/21/23 0705) Vital Signs (24h Range):  Temp:  [97.6 °F (36.4 °C)-99.2 °F (37.3 °C)] 98.3 °F (36.8 °C)  Pulse:  [] 81  Resp:  [13-23] 19  SpO2:  [95 %-100 %] 98 %  BP: (104-148)/(54-85) 129/60     Date 10/21/23 0700 - 10/22/23 0659   Shift 9571-9708 8795-8561 2231-3538 24 Hour Total   INTAKE   I.V.(mL/kg) 85(1.5)   85(1.5)   Shift Total(mL/kg) 85(1.5)   85(1.5)   OUTPUT   Shift Total(mL/kg)       Weight (kg) 55.8 55.8 55.8 55.8                       Female External Urinary Catheter 10/20/23 1901 (Active)   Skin no redness;no breakdown 10/21/23 0705   Tolerance no signs/symptoms of discomfort 10/21/23 0705   Suction Continuous suction at 70 mmHg 10/21/23 0705   Date of last wick change 10/21/23 10/21/23 0705   Time of last wick change 0721 10/21/23 0705   Output (mL) 500 mL 10/21/23 0301          Physical Exam         Neurosurgery  Physical Exam  General: well developed, well nourished, no distress.   Head: normocephalic, atraumatic  Neurologic: Alert and oriented. Thought content appropriate.  GCS: Motor: 6/Verbal: 5/Eyes: 4 GCS Total: 15  Mental Status: Awake, Alert, Oriented x2 (struggled to name the year)  Speech: word-finding difficulty and intermittent slurred speech   Cranial nerves: face symmetric, CN II-XII grossly intact.   Eyes: pupils equal, round, reactive to light , EOMI.   Sensory: intact to light touch throughout     Motor Strength:Moves all extremities spontaneously with good tone.  Full strength upper and lower extremities. No abnormal movements seen.      Strength   Deltoids Triceps Biceps Wrist Extension Wrist Flexion Hand    Upper: R 5/5 5/5 5/5 5/5 5/5 5/5     L 5/5 5/5 5/5 5/5 5/5 5/5       Iliopsoas Quadriceps Knee  Flexion Tibialis  anterior Gastro- cnemius EHL   Lower: R 5/5 5/5 5/5 5/5 5/5 5/5     L 5/5 5/5 5/5 5/5 5/5 5/5      Pronator Drift: no drift noted  Finger-to-nose: Intact bilaterally       Significant Labs:  Recent Labs   Lab 10/20/23  1043 10/21/23  0306   * 76    139   K 4.2 3.8    109   CO2 28 20*   BUN 19* 18   CREATININE 1.01 0.8   CALCIUM 9.3 8.5*   MG 2.0 1.7     Recent Labs   Lab 10/20/23  1043 10/21/23  0431   WBC 7.30 5.40   HGB 13.9 10.5*   HCT 41.8 31.9*    111*     Recent Labs   Lab 10/20/23  1043 10/21/23  0306   LABPT 12.8  --    INR 1.0 1.0   APTT  --  23.8     Microbiology Results (last 7 days)       ** No results found for the last 168 hours. **          All pertinent labs from the last 24 hours have been reviewed.    Significant Diagnostics:  I have reviewed all pertinent imaging results/findings within the past 24 hours.    X-Ray Abdomen AP 1 View    Result Date: 10/21/2023  As above. Electronically signed by: Viet Gore MD Date:    10/21/2023 Time:    00:59    X-Ray Chest AP Portable    Result Date: 10/21/2023  As above. Electronically signed  by: Viet Gore MD Date:    10/21/2023 Time:    00:56    CTA STROKE MULTI-PHASE    Result Date: 10/20/2023  Stable left temporal lobe intraparenchymal hemorrhage with minimal mass effect.  No detrimental interval change. No significant abnormality of major cervical and head arteries. Nodular left thyroid gland with heterogeneous enhancement.  Recommend thyroid ultrasound for further evaluation. Bilateral upper lobe several scattered pulmonary micro nodules ranging 2-4 mm.  For multiple solid nodules all <6 mm, Fleischner Society 2017 guidelines recommend no routine follow up for a low risk patient, or follow up with non-contrast chest CT at 12 months after discovery in a high risk patient. Electronically signed by resident: Katherine Toth Date:    10/20/2023 Time:    17:09 Electronically signed by: Tyrell Miller MD Date:    10/20/2023 Time:    18:38    CT Head Without Contrast    Result Date: 10/20/2023  Findings within the posterior aspect of the left temporal lobe most consistent with intraparenchymal hemorrhage with surrounding edema.  No midline shift.  This finding is located in the periphery of the cerebrum.  Sequela of a venous infarct is considered in the differential.  Underlying mass not excluded. COMMUNICATION Findings phoned to Dr. Lefort in the ED at the time of interpretation. Electronically signed by: Papo Estrada MD Date:    10/20/2023 Time:    11:07

## 2023-10-21 NOTE — SUBJECTIVE & OBJECTIVE
Neurologic Chief Complaint: aphasia and headache    Subjective:     Interval History: Patient is seen for follow-up neurological assessment and treatment recommendations: BP at goal, neuro exam stable, imaging suggestive of CAA but plans to confirm with SWI, patient pending step down, discussed stroke POC with patient and family    HPI, Past Medical, Family, and Social History remains the same as documented in the initial encounter.     Review of Systems   Constitutional:  Negative for fever.   Respiratory:  Negative for cough.    Cardiovascular:  Negative for chest pain.   Gastrointestinal:  Negative for nausea and vomiting.   Neurological:  Positive for speech difficulty. Negative for facial asymmetry, weakness, numbness and headaches.     Scheduled Meds:   polyethylene glycol  17 g Oral Daily    senna-docusate 8.6-50 mg  1 tablet Oral BID     Continuous Infusions:  PRN Meds:0.9%  NaCl infusion (for blood administration), acetaminophen, labetalol, magnesium oxide, magnesium oxide, ondansetron, potassium bicarbonate, potassium bicarbonate, potassium bicarbonate, potassium, sodium phosphates, potassium, sodium phosphates, potassium, sodium phosphates, sodium chloride 0.9%    Objective:     Vital Signs (Most Recent):  Temp: 98.6 °F (37 °C) (10/21/23 1105)  Pulse: 73 (10/21/23 1405)  Resp: (!) 24 (10/21/23 1405)  BP: (!) 106/58 (10/21/23 1405)  SpO2: 98 % (10/21/23 1405)  BP Location: Right arm    Vital Signs Range (Last 24H):  Temp:  [98.3 °F (36.8 °C)-99.2 °F (37.3 °C)]   Pulse:  []   Resp:  [13-24]   BP: (104-142)/(54-70)   SpO2:  [95 %-100 %]   BP Location: Right arm       Physical Exam  Vitals reviewed.   Constitutional:       General: She is not in acute distress.  HENT:      Head: Normocephalic and atraumatic.   Cardiovascular:      Rate and Rhythm: Normal rate.   Pulmonary:      Effort: Pulmonary effort is normal. No respiratory distress.   Skin:     General: Skin is warm and dry.   Neurological:       "Mental Status: She is alert.              Neurological Exam:   LOC: alert  Attention Span: Good   Language: Expressive aphasia  Articulation: No dysarthria  Orientation: Person, Place, Time   Visual Fields: Full  EOM (CN III, IV, VI): Full/intact  Facial Movement (CN VII): Symmetric facial expression    Motor: Arm left  Normal 5/5  Leg left  Normal 5/5  Arm right  Normal 5/5  Leg right Normal 5/5  Sensation: Intact to light touch, temperature and vibration  Tone: Normal tone throughout    Laboratory:  CMP:   Recent Labs   Lab 10/21/23  0306   CALCIUM 8.5*   ALBUMIN 3.2*   PROT 6.4      K 3.8   CO2 20*      BUN 18   CREATININE 0.8   ALKPHOS 70   ALT 11   AST 17   BILITOT 2.2*     CBC:   Recent Labs   Lab 10/21/23  0431   WBC 5.40   RBC 3.41*   HGB 10.5*   HCT 31.9*   *   MCV 94   MCH 30.8   MCHC 32.9     Lipid Panel:   Recent Labs   Lab 10/20/23  1741   CHOL 154  154   LDLCALC 87.4  87.4   HDL 52  52   TRIG 73  73     Coagulation:   Recent Labs   Lab 10/21/23  0306   INR 1.0   APTT 23.8     Platelet Aggregation Study: No results for input(s): "PLTAGG", "PLTAGINTERP", "PLTAGREGLACO", "ADPPLTAGGREG" in the last 168 hours.  Hgb A1C:   Recent Labs   Lab 10/20/23  1741   HGBA1C 5.4  5.4     TSH:   Recent Labs   Lab 10/20/23  1741   TSH 1.671  1.671       Diagnostic Results     Brain Imaging   MRI SWI pending    MRI Brain W/WO 10/21/2023  Evidence of convexal SAH suggestive of possible underlying CAA      CTH without contrast 10/20/2023 @ OSH  Impression:  Findings within the posterior aspect of the left temporal lobe most consistent with intraparenchymal hemorrhage with surrounding edema.  No midline shift.  This finding is located in the periphery of the cerebrum.  Sequela of a venous infarct is considered in the differential.  Underlying mass not excluded.     Vessel Imaging   CTA multiphase 10/20/23  Impression:     Stable left temporal lobe intraparenchymal hemorrhage with minimal mass effect. "  No detrimental interval change.     No significant abnormality of major cervical and head arteries.     Nodular left thyroid gland with heterogeneous enhancement.  Recommend thyroid ultrasound for further evaluation.     Bilateral upper lobe several scattered pulmonary micro nodules ranging 2-4 mm.  For multiple solid nodules all <6 mm, Fleischner Society 2017 guidelines recommend no routine follow up for a low risk patient, or follow up with non-contrast chest CT at 12 months after discovery in a high risk patient.    Cardiac Imaging   TTE pending

## 2023-10-21 NOTE — PROGRESS NOTES
"[facility] - Neuro Critical Care  Neurosurgery  Progress Note    Subjective:     History of Present Illness: Jody Muñoz is a 74yo woman w/PMHx hypothyroidism, on ASA daily for unclear indication, presenting w/2 days of worsening expressive aphasia. She also describes a "pressure-like" sensation in her head. Her symptoms became apparent when she found herself unable to write a check or state the dates of birth of her children then became progressively noticeable to her . Denies nausea, vomiting, visual changes, weakness/numbness of the extremities or gait instability. CTH revealed L temporal IPH vs mass with surrounding edema. Neurosurgery consulted for input.       Post-Op Info:  * No surgery found *         Interval History: NAEON. Denies headache this AM. Interval CTA with grossly stable hemorrhage w/o underlying vascular lesion. MRI appears w/o underlying mass however final read pending, will need interval MRI in 6 weeks.    Medications:  Continuous Infusions:   sodium chloride 0.9% 75 mL/hr at 10/21/23 0705    nicardipine Stopped (10/20/23 1515)     Scheduled Meds:   levETIRAcetam (Keppra) IV (PEDS and ADULTS)  500 mg Intravenous Q12H    polyethylene glycol  17 g Oral Daily    senna-docusate 8.6-50 mg  1 tablet Oral BID     PRN Meds:0.9%  NaCl infusion (for blood administration), acetaminophen, labetalol, magnesium oxide, magnesium oxide, ondansetron, potassium bicarbonate, potassium bicarbonate, potassium bicarbonate, potassium, sodium phosphates, potassium, sodium phosphates, potassium, sodium phosphates, sodium chloride 0.9%     Review of Systems  Objective:     Weight: 55.8 kg (123 lb)  Body mass index is 21.79 kg/m².  Vital Signs (Most Recent):  Temp: 98.3 °F (36.8 °C) (10/21/23 0705)  Pulse: 81 (10/21/23 0705)  Resp: 19 (10/21/23 0705)  BP: 129/60 (10/21/23 0705)  SpO2: 98 % (10/21/23 0705) Vital Signs (24h Range):  Temp:  [97.6 °F (36.4 °C)-99.2 °F (37.3 °C)] 98.3 °F (36.8 °C)  Pulse:  " [] 81  Resp:  [13-23] 19  SpO2:  [95 %-100 %] 98 %  BP: (104-148)/(54-85) 129/60     Date 10/21/23 0700 - 10/22/23 0659   Shift 0088-1977 9697-8920 2990-5973 24 Hour Total   INTAKE   I.V.(mL/kg) 85(1.5)   85(1.5)   Shift Total(mL/kg) 85(1.5)   85(1.5)   OUTPUT   Shift Total(mL/kg)       Weight (kg) 55.8 55.8 55.8 55.8                       Female External Urinary Catheter 10/20/23 1901 (Active)   Skin no redness;no breakdown 10/21/23 0705   Tolerance no signs/symptoms of discomfort 10/21/23 0705   Suction Continuous suction at 70 mmHg 10/21/23 0705   Date of last wick change 10/21/23 10/21/23 0705   Time of last wick change 0721 10/21/23 0705   Output (mL) 500 mL 10/21/23 0301          Physical Exam         Neurosurgery Physical Exam  General: well developed, well nourished, no distress.   Head: normocephalic, atraumatic  Neurologic: Alert and oriented. Thought content appropriate.  GCS: Motor: 6/Verbal: 5/Eyes: 4 GCS Total: 15  Mental Status: Awake, Alert, Oriented x2 (struggled to name the year)  Speech: word-finding difficulty and intermittent slurred speech   Cranial nerves: face symmetric, CN II-XII grossly intact.   Eyes: pupils equal, round, reactive to light , EOMI.   Sensory: intact to light touch throughout     Motor Strength:Moves all extremities spontaneously with good tone.  Full strength upper and lower extremities. No abnormal movements seen.      Strength   Deltoids Triceps Biceps Wrist Extension Wrist Flexion Hand    Upper: R 5/5 5/5 5/5 5/5 5/5 5/5     L 5/5 5/5 5/5 5/5 5/5 5/5       Iliopsoas Quadriceps Knee  Flexion Tibialis  anterior Gastro- cnemius EHL   Lower: R 5/5 5/5 5/5 5/5 5/5 5/5     L 5/5 5/5 5/5 5/5 5/5 5/5      Pronator Drift: no drift noted  Finger-to-nose: Intact bilaterally       Significant Labs:  Recent Labs   Lab 10/20/23  1043 10/21/23  0306   * 76    139   K 4.2 3.8    109   CO2 28 20*   BUN 19* 18   CREATININE 1.01 0.8   CALCIUM 9.3 8.5*   MG 2.0  1.7     Recent Labs   Lab 10/20/23  1043 10/21/23  0431   WBC 7.30 5.40   HGB 13.9 10.5*   HCT 41.8 31.9*    111*     Recent Labs   Lab 10/20/23  1043 10/21/23  0306   LABPT 12.8  --    INR 1.0 1.0   APTT  --  23.8     Microbiology Results (last 7 days)       ** No results found for the last 168 hours. **          All pertinent labs from the last 24 hours have been reviewed.    Significant Diagnostics:  I have reviewed all pertinent imaging results/findings within the past 24 hours.    X-Ray Abdomen AP 1 View    Result Date: 10/21/2023  As above. Electronically signed by: Viet Gore MD Date:    10/21/2023 Time:    00:59    X-Ray Chest AP Portable    Result Date: 10/21/2023  As above. Electronically signed by: Viet Gore MD Date:    10/21/2023 Time:    00:56    CTA STROKE MULTI-PHASE    Result Date: 10/20/2023  Stable left temporal lobe intraparenchymal hemorrhage with minimal mass effect.  No detrimental interval change. No significant abnormality of major cervical and head arteries. Nodular left thyroid gland with heterogeneous enhancement.  Recommend thyroid ultrasound for further evaluation. Bilateral upper lobe several scattered pulmonary micro nodules ranging 2-4 mm.  For multiple solid nodules all <6 mm, Fleischner Society 2017 guidelines recommend no routine follow up for a low risk patient, or follow up with non-contrast chest CT at 12 months after discovery in a high risk patient. Electronically signed by resident: Katherine Toth Date:    10/20/2023 Time:    17:09 Electronically signed by: Tyrell Miller MD Date:    10/20/2023 Time:    18:38    CT Head Without Contrast    Result Date: 10/20/2023  Findings within the posterior aspect of the left temporal lobe most consistent with intraparenchymal hemorrhage with surrounding edema.  No midline shift.  This finding is located in the periphery of the cerebrum.  Sequela of a venous infarct is considered in the differential.  Underlying mass not excluded.  COMMUNICATION Findings phoned to Dr. Lefort in the ED at the time of interpretation. Electronically signed by: Papo Estrada MD Date:    10/20/2023 Time:    11:07       Assessment/Plan:     Temporal lobe lesion  72yo W w/hypothyroidism, on daily ASA (no clear indication) presenting w/worsening aphasia x 2 days. No other focal deficits on exam. No acute neurosurgical intervention indicated but will follow along closely.     - Admitted to NCC  - CTH with L temporal lesions c/f intraparenchymal hemorrhage vs mass with surrounding edema  - HOLD aspirin, give DDAVP and platelets for reversal   - Interval CTA w/o underlying lesion, stable hemorrhage  - MRI final read pending however appears negative for underlying mass  - Keppra 1g followed by 500mg BID  - SBP < 140  - Q1 hr neuro checks  - Care per Vascular Neurology/NCC, recommend interval MRI in 6 weeks at f/up    NSGY will sign off, please call w/questions        Smita Reyes MD  Neurosurgery  [facility] - Neuro Critical Care

## 2023-10-21 NOTE — PT/OT/SLP EVAL
"Physical Therapy Evaluation    Patient Name:  Jody Muñoz   MRN:  [id number]    Recommendations:     Discharge Recommendations: Low Intensity Therapy   Discharge Equipment Recommendations: none   Barriers to discharge: Decreased caregiver support    Assessment:     Jody Muñoz is a 73 y.o. female admitted with a medical diagnosis of Nontraumatic cortical hemorrhage of left cerebral hemisphere.  She presents with the following impairments/functional limitations: weakness, impaired endurance, impaired self care skills, impaired functional mobility, decreased lower extremity function, decreased safety awareness. Pt tolerated session well, but did exhibit some aphasia during evaluation today. Pt mobilizing well, but would continue to benefit from acute care PT services while in house.    Rehab Prognosis: Good; patient would benefit from acute skilled PT services to address these deficits and reach maximum level of function.    Recent Surgery: * No surgery found *      Plan:     During this hospitalization, patient to be seen 3 x/week to address the identified rehab impairments via gait training, therapeutic activities, therapeutic exercises, neuromuscular re-education and progress toward the following goals:    Plan of Care Expires:  11/20/23    Subjective     Chief Complaint: none verbalized  Patient/Family Comments/goals: "this is all new to us" - pt's daughter reporting she was completely independent prior to admission to hospital   Pain/Comfort:  Pain Rating 1: 0/10  Pain Rating Post-Intervention 1: 0/10    Patients cultural, spiritual, Confucianist conflicts given the current situation: no    Living Environment:  Pt lives with her  in a [facility] with 0E.   Prior to admission, patients level of function was independent for all functional mobility and ADLs. Pt drives and enjoys line dancing.  Equipment used at home: none.  DME owned (not currently used): none.  Upon discharge, patient will have " assistance from family and spouse.    Objective:     Communicated with RN prior to session.  Patient found sitting edge of bed with peripheral IV, telemetry, SCD  upon PT entry to room.    General Precautions: Standard, fall, aspiration  Orthopedic Precautions:N/A   Braces: N/A  Respiratory Status: Room air    Exams:  Cognitive Exam: not formally tested, no signs of disorientation, pt is aphasic  Gross Motor Coordination:  WFL  Postural Exam:  Patient presented with the following abnormalities:    -       No postural abnormalities identified  Sensation:  denies numbness/tingling, endorses normal light touch in BLEs  RLE ROM: WFL  RLE Strength: Deficits: hip flexion 4/5, knee extension 5/5, knee flexion 4/5, DF 5/5  LLE ROM: WFL  LLE Strength: Deficits: hip flexion 4/5, knee extension 5/5, knee flexion 4/5, DF 5/5    Functional Mobility:  Bed Mobility:   N/T, pt sitting EOB with RN  Transfers:     Sit to Stand:  stand by assistance with no AD  Gait: 6 ft to WC, no AD, SBA; no LOBs note, decreased gait speed   Balance:   Static Sitting: Supervision  Dynamic Sitting: SBA  Static Standing: SBA  Dynamic Standing: SBA      AM-PAC 6 CLICK MOBILITY  Total Score:19       Treatment & Education:  Patient educated on role of therapy, goals of session, and benefits of mobilizing.   Discussed PT plan of care during hospitalization.   Patient educated on calling for assistance.   Patient educated on how their diagnosis impacts their mobility within PT scope of practice.   All questions answered within PT scope of practice.    Patient left up in wheel chair with all lines intact and RN and family present and patient about to step down to floor.    GOALS:   Multidisciplinary Problems       Physical Therapy Goals          Problem: Physical Therapy    Goal Priority Disciplines Outcome Goal Variances Interventions   Physical Therapy Goal     PT, PT/OT Ongoing, Progressing     Description: Goals to be met by: 11/4/2023     Patient will  increase functional independence with mobility by performin. Supine to sit with [place]  2. Sit to supine with [place]  3. Sit to stand transfer with [place]  4. Bed to chair transfer with [place] using No Assistive Device  5. Gait  x 200 feet with [place] using No Assistive Device.   6. Lower extremity exercise program x10 reps per handout, with supervision                         History:     Past Medical History:   Diagnosis Date    Temporal lobe lesion 10/20/2023    Thyroid disease        Past Surgical History:   Procedure Laterality Date    C SECTION      LAPAROSCOPIC APPENDECTOMY N/A 2021    Procedure: APPENDECTOMY, LAPAROSCOPIC;  Surgeon: Donald James MD;  Location: [facility];  Service: General;  Laterality: N/A;       Time Tracking:     PT Received On: 10/21/23  PT Start Time: 1449     PT Stop Time: 1455  PT Total Time (min): 6 min     Billable Minutes: Evaluation 6      10/21/2023

## 2023-10-21 NOTE — PT/OT/SLP EVAL
Speech Language Pathology Evaluation  Bedside Swallow    Patient Name:  Jody Muñoz   MRN:  [id number]  Admitting Diagnosis: Nontraumatic cortical hemorrhage of left cerebral hemisphere    Recommendations:                 General Recommendations:  Speech language evaluation and check diet tolerance  Diet recommendations:  Regular Diet - IDDSI Level 7, Thin   Aspiration Precautions: Avoid talking while eating, HOB to 90 degrees, Monitor for s/s of aspiration, and Standard aspiration precautions   General Precautions: Standard, aspiration, fall  Communication strategies:  provide increased time to answer and go to room if call light pushed    Assessment:     Jody Muoñz is a 73 y.o. female.  Swallowing abilities appear to be WFL. Pt appears safe to initiate a regular consistency diet and thin liquids.  Speech/language/cognitive evaluation to be initiated on next service date.     History:     Past Medical History:   Diagnosis Date    Temporal lobe lesion 10/20/2023    Thyroid disease        Past Surgical History:   Procedure Laterality Date    C SECTION      LAPAROSCOPIC APPENDECTOMY N/A 11/9/2021    Procedure: APPENDECTOMY, LAPAROSCOPIC;  Surgeon: Donald James MD;  Location: [facility];  Service: General;  Laterality: N/A;     History of Present Illness: Jody Muñoz is a 73 y.o. patient with past medical history of hypothyroidism on synthroid and on aspirin without clear medical indication. Presented to [facility] for a 2-day history of expressive aphasia and persistent pressure-like headaches and recent memory problems. Patient was unable to write or recall her family members names. As per  at bedside, no trauma or fall. Patient denies visual disturbances or weakness. CT head with posterior aspect of the left temporal lobe consistent with intraparenchymal hemorrhage and surrounding edema. No midline shift. Vital signs stable upon admission. On physical exam patient with  expressive aphasia. Strength 5/5, no drift. CTA and brain MRI upon admission pending.     Patient will be admitted to Neuro ICU for further management and treatment.      Prior Intubation HX:  none during this admission    Modified Barium Swallow: none on file    Chest X-Rays: 10/21/23: FINDINGS:  Cardiac monitoring leads overlie the chest.  The cardiac silhouette appears within normal limits.  There is mild elevation of the right hemidiaphragm.  There is mild coarse interstitial attenuation without evidence of confluent airspace consolidation.  No significant volume of pleural fluid or pneumothorax identified.  The visualized osseous structures demonstrate mild degenerative change.  No unexpected retained radiopaque metallic foreign body identified.    Prior diet: currently NPO; passed Story     Subjective     Pt was sitting upright in bedside chair, awake/alert, and oriented x 4. Daughter and  at bedside.   Pain/Comfort:  Pain Rating 1: 0/10    Respiratory Status: Room air    Objective:     Oral Musculature Evaluation  Oral Musculature: WFL  Dentition: present and adequate  Secretion Management: adequate  Mucosal Quality: adequate  Mandibular Strength and Mobility: WFL  Oral Labial Strength and Mobility: WFL  Lingual Strength and Mobility: WFL  Velar Elevation: WFL  Buccal Strength and Mobility: WFL  Volitional Cough: adequate  Volitional Swallow: elicited  Voice Prior to PO Intake: dry, clear    Bedside Swallow Eval:   Consistencies Assessed:  Thin liquids cup sip x 1, straw sips x 3  Solids 1/4 cracker x 2       Oral Phase:   WFL    Pharyngeal Phase:   no overt clinical signs/symptoms of aspiration  no overt clinical signs/symptoms of pharyngeal dysphagia    Compensatory Strategies  None    Treatment: Education provided to pt and family regarding role of SLP, purpose of swallowing assessment, diet recommendations, plans for speech/language/cognitive evaluation on next service date, general strategies for  enhancing communication and word finding, and SLP treatment plan and POC.   Pt demonstrated understanding of education provided, but will benefit from continued reinforcement. Family demonstrated understanding as well.     Goals:   Multidisciplinary Problems       SLP Goals          Problem: SLP    Goal Priority Disciplines Outcome   SLP Goal     SLP    Description: Speech Language Pathology Goals  Goals expected to be met by 10/27:  1. Pt will tolerate least restrictive diet without s/s of aspiration.  2. Pt will participate in speech/language/cognitive evaluation.                                Plan:     Patient to be seen:  4 x/week   Plan of Care expires:  11/20/23  Plan of Care reviewed with:  patient, daughter, spouse   SLP Follow-Up:  Yes       Discharge recommendations:  Low Intensity Therapy     Time Tracking:     SLP Treatment Date:   10/21/23  Speech Start Time:  0902  Speech Stop Time:  0912     Speech Total Time (min):  10 min    Billable Minutes: Eval Swallow and Oral Function 10    10/21/2023

## 2023-10-21 NOTE — PROGRESS NOTES
[facility] - Neurosurgery ([facility])  Vascular Neurology  Comprehensive Stroke Center  Progress Note    Assessment/Plan:     * Nontraumatic cortical hemorrhage of left cerebral hemisphere  Jody Muñoz is a 73 y.o. female with PMH of hypothyroidism, on daily ASA for unclear reason that presents as a transfer from [place] for higher level care of L temporal ICH. Patient initially presented with progressively worsening expressive aphasia x 2 days as well as pressure-like HA. CTH revealed L temporal IPH with surrounding edema, underlying mass not excluded. At C neuro exam significant for expressive aphasia with NIHSS 2.     NSGY consulted, no acute surgical intervention required, patient received DDAVP/Plts for reversal of ASA. Follow up MRI W/WO with evidence of convexal SAH suggestive of possible underlying CAA, will confirm with SWI imaging. Patient pending step down.      Antithrombotics for secondary stroke prevention: Antiplatelets: None: Intracerebral Hemorrhage    Statins for secondary stroke prevention: Statins: None: Reason: not indicated in acute ICH    Aggressive risk factor modification: Diet, None     Rehab efforts: The patient has been evaluated by a stroke team provider and the therapy needs have been fully considered based off the presenting complaints and exam findings. The following therapy evaluations are needed: PT evaluate and treat, OT evaluate and treat, SLP evaluate and treat    Diagnostics ordered/pending: Other: MRI with SWI only    VTE prophylaxis: Mechanical prophylaxis: Place SCDs  None: Reason for No Pharmacological VTE Prophylaxis: ICH    BP parameters: ICH: SBP <140        Pulmonary nodules  Noted incidentally on CTA multiphase  F/u with PCP    Vasogenic cerebral edema  -Area of cerebral edema identified when reviewing brain imaging in the L temporal lobe  -minimal mass effect, no MLS  -exam and follow up imaging stable, patient stepping down to NPU, q4h neuro checks  -Obtain  stat CTH for any new or worsening neuro changes and notify primary team immediately         10/21 BP at goal, neuro exam stable, imaging suggestive of CAA but plans to confirm with SWI, patient pending step down, discussed stroke POC with patient and family      STROKE DOCUMENTATION        NIH Scale:  1a. Level of Consciousness: 0-->Alert, keenly responsive  1b. LOC Questions: 0-->Answers both questions correctly  1c. LOC Commands: 0-->Performs both tasks correctly  2. Best Gaze: 0-->Normal  3. Visual: 0-->No visual loss  4. Facial Palsy: 0-->Normal symmetrical movements  5a. Motor Arm, Left: 0-->No drift, limb holds 90 (or 45) degrees for full 10 secs  5b. Motor Arm, Right: 0-->No drift, limb holds 90 (or 45) degrees for full 10 secs  6a. Motor Leg, Left: 0-->No drift, leg holds 30 degree position for full 5 secs  6b. Motor Leg, Right: 0-->No drift, leg holds 30 degree position for full 5 secs  7. Limb Ataxia: 0-->Absent  8. Sensory: 0-->Normal, no sensory loss  9. Best Language: 1-->Mild-to-moderate aphasia, some obvious loss of fluency or facility of comprehension, without significant limitation on ideas expressed or form of expression. Reduction of speech and/or comprehension, however, makes conversation. . . (see row details)  10. Dysarthria: 0-->Normal  11. Extinction and Inattention (formerly Neglect): 0-->No abnormality  Total (NIH Stroke Scale): 1       Modified Flower Score: 0  Hemlock Coma Scale:    ABCD2 Score:    [id number] Score:   HAS -BLED Score:   ICH Score:0  Hunt & Grace Classification:      Hemorrhagic change of an Ischemic Stroke: Does this patient have an ischemic stroke with hemorrhagic changes? No     Neurologic Chief Complaint: aphasia and headache    Subjective:     Interval History: Patient is seen for follow-up neurological assessment and treatment recommendations: BP at goal, neuro exam stable, imaging suggestive of CAA but plans to confirm with SWI, patient pending step down, discussed  stroke POC with patient and family    HPI, Past Medical, Family, and Social History remains the same as documented in the initial encounter.     Review of Systems   Constitutional:  Negative for fever.   Respiratory:  Negative for cough.    Cardiovascular:  Negative for chest pain.   Gastrointestinal:  Negative for nausea and vomiting.   Neurological:  Positive for speech difficulty. Negative for facial asymmetry, weakness, numbness and headaches.     Scheduled Meds:   polyethylene glycol  17 g Oral Daily    senna-docusate 8.6-50 mg  1 tablet Oral BID     Continuous Infusions:  PRN Meds:0.9%  NaCl infusion (for blood administration), acetaminophen, labetalol, magnesium oxide, magnesium oxide, ondansetron, potassium bicarbonate, potassium bicarbonate, potassium bicarbonate, potassium, sodium phosphates, potassium, sodium phosphates, potassium, sodium phosphates, sodium chloride 0.9%    Objective:     Vital Signs (Most Recent):  Temp: 98.6 °F (37 °C) (10/21/23 1105)  Pulse: 73 (10/21/23 1405)  Resp: (!) 24 (10/21/23 1405)  BP: (!) 106/58 (10/21/23 1405)  SpO2: 98 % (10/21/23 1405)  BP Location: Right arm    Vital Signs Range (Last 24H):  Temp:  [98.3 °F (36.8 °C)-99.2 °F (37.3 °C)]   Pulse:  []   Resp:  [13-24]   BP: (104-142)/(54-70)   SpO2:  [95 %-100 %]   BP Location: Right arm       Physical Exam  Vitals reviewed.   Constitutional:       General: She is not in acute distress.  HENT:      Head: Normocephalic and atraumatic.   Cardiovascular:      Rate and Rhythm: Normal rate.   Pulmonary:      Effort: Pulmonary effort is normal. No respiratory distress.   Skin:     General: Skin is warm and dry.   Neurological:      Mental Status: She is alert.              Neurological Exam:   LOC: alert  Attention Span: Good   Language: Expressive aphasia  Articulation: No dysarthria  Orientation: Person, Place, Time   Visual Fields: Full  EOM (CN III, IV, VI): Full/intact  Facial Movement (CN VII): Symmetric facial  "expression    Motor: Arm left  Normal 5/5  Leg left  Normal 5/5  Arm right  Normal 5/5  Leg right Normal 5/5  Sensation: Intact to light touch, temperature and vibration  Tone: Normal tone throughout    Laboratory:  CMP:   Recent Labs   Lab 10/21/23  0306   CALCIUM 8.5*   ALBUMIN 3.2*   PROT 6.4      K 3.8   CO2 20*      BUN 18   CREATININE 0.8   ALKPHOS 70   ALT 11   AST 17   BILITOT 2.2*     CBC:   Recent Labs   Lab 10/21/23  0431   WBC 5.40   RBC 3.41*   HGB 10.5*   HCT 31.9*   *   MCV 94   MCH 30.8   MCHC 32.9     Lipid Panel:   Recent Labs   Lab 10/20/23  1741   CHOL 154  154   LDLCALC 87.4  87.4   HDL 52  52   TRIG 73  73     Coagulation:   Recent Labs   Lab 10/21/23  0306   INR 1.0   APTT 23.8     Platelet Aggregation Study: No results for input(s): "PLTAGG", "PLTAGINTERP", "PLTAGREGLACO", "ADPPLTAGGREG" in the last 168 hours.  Hgb A1C:   Recent Labs   Lab 10/20/23  1741   HGBA1C 5.4  5.4     TSH:   Recent Labs   Lab 10/20/23  1741   TSH 1.671  1.671       Diagnostic Results     Brain Imaging   MRI SWI pending    MRI Brain W/WO 10/21/2023  Evidence of convexal SAH suggestive of possible underlying CAA      CTH without contrast 10/20/2023 @ OSH  Impression:  Findings within the posterior aspect of the left temporal lobe most consistent with intraparenchymal hemorrhage with surrounding edema.  No midline shift.  This finding is located in the periphery of the cerebrum.  Sequela of a venous infarct is considered in the differential.  Underlying mass not excluded.     Vessel Imaging   CTA multiphase 10/20/23  Impression:     Stable left temporal lobe intraparenchymal hemorrhage with minimal mass effect.  No detrimental interval change.     No significant abnormality of major cervical and head arteries.     Nodular left thyroid gland with heterogeneous enhancement.  Recommend thyroid ultrasound for further evaluation.     Bilateral upper lobe several scattered pulmonary micro nodules " ranging 2-4 mm.  For multiple solid nodules all <6 mm, Fleischner Society 2017 guidelines recommend no routine follow up for a low risk patient, or follow up with non-contrast chest CT at 12 months after discovery in a high risk patient.    Cardiac Imaging   TTE pending      Breanne Mendiola PA-C  [facility]  Department of Vascular Neurology   [facility])

## 2023-10-21 NOTE — HOSPITAL COURSE
10/21 BP at goal, neuro exam stable, imaging suggestive of CAA but plans to confirm with SWI, patient pending step down, discussed stroke POC with patient and family    Patient admitted with L temporal ICH after presenting to OSH with progressively worsening expressive aphasia for 2 days and pressure like headaches. CTH revealed evidence of L temporal ICH. [place] [facility] [place]. SWI MRI showing evolving but stable temporal hemorrage and additional SAH in the posterior frontal sulci at the vertex. Findings are concerning for potential amyloid angiopathy. NSGY was consulted and recommended no acute surgical intervention. Plts given for reversal of patient's home ASA. Patient will need a repeat SWI MRI in 6-8 weeks and will follow up with vascular neurology in 4-6 weeks.

## 2023-10-21 NOTE — CONSULTS
[facility] - Neuro Critical Care  Adult Nutrition  Progress Note    SUMMARY       Recommendations    1) Continue regular diet   2) If intake <50% add boost plus BID   3) Following    Goals: meet % een/epn by next rd f/u  Nutrition Goal Status: new  Communication of RD Recs: other (comment) (POC)    Assessment and Plan    Nutrition Problem  Inadequate oral intake    Related to (etiology):   Inability to consume sufficient energy     Signs and Symptoms (as evidenced by):   25-50% intake of meals     Interventions/Recommendations (treatment strategy):  Collaboration with other providers    Nutrition Diagnosis Status:   New    Malnutrition Assessment     Skin (Micronutrient): none  Nails (Micronutrient): none  Hair/Scalp (Micronutrient): none  Eyes (Micronutrient): none  Extraoral (Micronutrient): none  Gums (Micronutrient): none  Lips/Mucous Membranes (Micronutrient): none  Teeth (Micronutrient): none  Tongue (Micronutrient): none  Neck/Chest (Micronutrient): none  Musculoskeletal/Lower Extremities: none   Micronutrient Evaluation Summary: no deficiencies   Muscle Mass (Malnutrition): mild depletion   Orbital Region (Subcutaneous Fat Loss): well nourished   Taoist Region (Muscle Loss): mild depletion  Clavicle Bone Region (Muscle Loss): mild depletion  Clavicle and Acromion Bone Region (Muscle Loss): mild depletion  Dorsal Hand (Muscle Loss): moderate depletion  Patellar Region (Muscle Loss): mild depletion  Anterior Thigh Region (Muscle Loss): mild depletion  Posterior Calf Region (Muscle Loss): mild depletion                 Reason for Assessment    Reason For Assessment: consult  Diagnosis: other (see comments) (Nontraumatic cortical hemorrhage of left cerebral hemisphere)  Relevant Medical History: hypothyroidism  Interdisciplinary Rounds: did not attend  General Information Comments: RD consult. Pt endorses fair appetite w/ 25-50% intake of breakfast. Denies N/V/D/C or chewing/swallowing issues.Decreased  "appetite for 1 week PTA.  Discussed pt's typical intake with family. Daughter states she has a low protein diet. Suggested boost QD at discharge. # NFPE completed 10/21, noted with mild muscle loss. At risk for malnutrition.RD following.   Nutrition Discharge Planning: adequate intake    Nutrition Risk Screen    Nutrition Risk Screen: no indicators present    Nutrition/Diet History    Patient Reported Diet/Restrictions/Preferences: general  Typical Food/Fluid Intake: 2 meals/day  Spiritual, Cultural Beliefs, Jain Practices, Values that Affect Care: no  Vitamin/Mineral/Herbal Supplements: MVI, Calcium  Food Allergies: NKFA    Anthropometrics    Temp: 98.6 °F (37 °C)  Height Method: Measured  Height: 5' 3" (160 cm)  Height (inches): 63 in  Weight Method: Bed Scale  Weight: 55.8 kg (123 lb)  Weight (lb): 123 lb  Ideal Body Weight (IBW), Female: 115 lb  % Ideal Body Weight, Female (lb): 106.96 %  BMI (Calculated): 21.8  BMI Grade: 18.5-24.9 - normal       Lab/Procedures/Meds    Pertinent Labs Reviewed: reviewed  Pertinent Labs Comments: H/H: 10.5/31.9  Pertinent Medications Reviewed: reviewed  Pertinent Medications Comments: polyethylene glycol, senna-docusate      Estimated/Assessed Needs    Weight Used For Calorie Calculations: 55.8 kg (123 lb)  Energy Calorie Requirements (kcal): 2961-4923 (20-25 kcal/kg)  Energy Need Method: Kcal/kg  Protein Requirements: 55-67 (1-1.2 g/kg)  Weight Used For Protein Calculations: 55.8 kg (123 lb)     Estimated Fluid Requirement Method: RDA Method  RDA Method (mL): 1115         Nutrition Prescription Ordered    Current Diet Order: Regular    Evaluation of Received Nutrient/Fluid Intake    I/O: +886 ml since admit  Comments: LBM 10/21  Tolerance: tolerating  % Intake of Estimated Energy Needs: 25 - 50 %  % Meal Intake: 25 - 50 %    Nutrition Risk    Level of Risk/Frequency of Follow-up: low (f/u 1x/week)     Monitor and Evaluation    Food and Nutrient Intake: energy intake, " food and beverage intake  Food and Nutrient Adminstration: diet order  Knowledge/Beliefs/Attitudes: food and nutrition knowledge/skill, beliefs and attitudes  Physical Activity and Function: nutrition-related ADLs and IADLs  Anthropometric Measurements: height/length, weight, weight change, body mass index  Biochemical Data, Medical Tests and Procedures: electrolyte and renal panel, gastrointestinal profile, glucose/endocrine profile, inflammatory profile, lipid profile  Nutrition-Focused Physical Findings: overall appearance, extremities, muscles and bones, head and eyes, skin     Nutrition Follow-Up    RD Follow-up?: Yes    Hollie Whitman RD, LDN

## 2023-10-21 NOTE — NURSING
Pt. Transported to [facility] @0130 with cardiac monitor and Ambu Bag via bed. No acute events during transfer. O2 device used room air. Pt had primary nurse assist.  Pt returned to [facility] rm [id number]  and room monitor reapplied. VSS. RN WCTM

## 2023-10-21 NOTE — SUBJECTIVE & OBJECTIVE
Interval History:  ok to step down to vascular neurology vs discharge home  Review of Systems   Constitutional: Negative.    HENT: Negative.     Eyes: Negative.    Respiratory: Negative.     Cardiovascular: Negative.    Gastrointestinal: Negative.    Endocrine: Negative.    Genitourinary: Negative.    Musculoskeletal: Negative.    Skin: Negative.    Neurological: Negative.      ***2 systems OR Unable to obtain a complete ROS due to level of consciousness.  Objective:     Vitals:  Temp: 98.6 °F (37 °C)  Pulse: 65  Rhythm: normal sinus rhythm  BP: (!) 112/57  MAP (mmHg): 78  Resp: 15  SpO2: 98 %    Temp  Min: 98.3 °F (36.8 °C)  Max: 99.2 °F (37.3 °C)  Pulse  Min: 64  Max: 101  BP  Min: 104/56  Max: 142/65  MAP (mmHg)  Min: 78  Max: 98  Resp  Min: 13  Max: 23  SpO2  Min: 95 %  Max: 100 %    10/20 0701 - 10/21 0700  In: 1886.2 [I.V.:820]  Out: 1000 [Urine:1000]   Unmeasured Output  Urine Occurrence: 1  Stool Occurrence: 1        Physical Exam  Vitals and nursing note reviewed.   Constitutional:       Appearance: Normal appearance.   HENT:      Head: Normocephalic.      Nose: Nose normal.      Mouth/Throat:      Mouth: Mucous membranes are moist.      Pharynx: Oropharynx is clear.   Eyes:      Pupils: Pupils are equal, round, and reactive to light.   Cardiovascular:      Rate and Rhythm: Normal rate and regular rhythm.      Pulses: Normal pulses.      Heart sounds: Normal heart sounds.   Pulmonary:      Effort: Pulmonary effort is normal.      Breath sounds: Normal breath sounds.   Abdominal:      General: Bowel sounds are normal.      Palpations: Abdomen is soft.   Musculoskeletal:         General: Normal range of motion.   Skin:     General: Skin is warm and dry.      Capillary Refill: Capillary refill takes 2 to 3 seconds.   Neurological:      Mental Status: She is alert.      Comments: GCS 15  AAAO X4  PERRL  VUONG to command and spontaneously  Sensation Intact X4       ***Unable to test {Select Exam:78555} due to  level of consciousness.       Medications:  Continuous Scheduledpolyethylene glycol, 17 g, Daily  senna-docusate 8.6-50 mg, 1 tablet, BID    PRN0.9%  NaCl infusion (for blood administration), , Q24H PRN  acetaminophen, 650 mg, Q6H PRN  labetalol, 10 mg, Q15 Min PRN  magnesium oxide, 800 mg, PRN  magnesium oxide, 800 mg, PRN  ondansetron, 4 mg, Q8H PRN  potassium bicarbonate, 35 mEq, PRN  potassium bicarbonate, 50 mEq, PRN  potassium bicarbonate, 60 mEq, PRN  potassium, sodium phosphates, 2 packet, PRN  potassium, sodium phosphates, 2 packet, PRN  potassium, sodium phosphates, 2 packet, PRN  sodium chloride 0.9%, 10 mL, PRN      Today I personally reviewed pertinent {Select Patient Info Reviewed:57086} notably:    Diet  Diet Adult Regular (IDDSI Level 7)  Diet Adult Regular (IDDSI Level 7)    ***

## 2023-10-21 NOTE — PLAN OF CARE
[facility] Care Plan    POC reviewed with Jody Muñoz and family at 0300. Pt verbalized understanding. Questions and concerns addressed. No acute events overnight. Pt progressing toward goals. Will continue to monitor. See below and flowsheets for full assessment and VS info.   -neuro exam unchanged, pts speech improving   -sbp's maintained <140 w/o interventions   -tylenol given x1 for HA, relief obtained   -1U PLTs given   -MRI complete   -replacing Mg   -NS infusing @75ml/hr     Is this a stroke patient? yes- Stroke booklet reviewed with patient and family, risk factors identified for patient and stroke booklet remains at bedside for ongoing education.     Neuro:  Trail Coma Scale  Best Eye Response: 4-->(E4) spontaneous  Best Motor Response: 6-->(M6) obeys commands  Best Verbal Response: 5-->(V5) oriented  Ron Coma Scale Score: 15  Assessment Qualifiers: patient not sedated/intubated, no eye obstruction present  Pupil PERRLA: yes     24hr Temp:  [97.6 °F (36.4 °C)-99.2 °F (37.3 °C)]     CV:   Rhythm: normal sinus rhythm  BP goals:   SBP < 140  MAP > 65    Resp:           Plan: N/A    GI/:     Diet/Nutrition Received: NPO  Last Bowel Movement: 10/20/23  Voiding Characteristics: external catheter    Intake/Output Summary (Last 24 hours) at 10/21/2023 0615  Last data filed at 10/21/2023 0557  Gross per 24 hour   Intake 1886.15 ml   Output 1000 ml   Net 886.15 ml          Labs/Accuchecks:  Recent Labs   Lab 10/21/23  0431   WBC 5.40   RBC 3.41*   HGB 10.5*   HCT 31.9*   *      Recent Labs   Lab 10/21/23  0306      K 3.8   CO2 20*      BUN 18   CREATININE 0.8   ALKPHOS 70   ALT 11   AST 17   BILITOT 2.2*      Recent Labs   Lab 10/21/23  0306   INR 1.0   APTT 23.8      Recent Labs   Lab 10/21/23  0306   TROPONINI 0.014       Electrolytes: Electrolytes replaced  Accuchecks: none    Gtts:   sodium chloride 0.9% 75 mL/hr at 10/21/23 0109    nicardipine Stopped (10/20/23 1515)        LDA/Wounds:  Lines/Drains/Airways       Drain  Duration             Female External Urinary Catheter 10/20/23 1901 <1 day              Peripheral Intravenous Line  Duration                  Peripheral IV - Single Lumen 10/20/23 1040 18 G Left Antecubital <1 day         Peripheral IV - Single Lumen 10/20/23 1538 20 G Anterior;Proximal;Right Antecubital <1 day                  Wounds: No  Wound care consulted: No      Problem: Adult Inpatient Plan of Care  Goal: Plan of Care Review  Flowsheets (Taken 10/21/2023 0332)  Plan of Care Reviewed With:   patient   spouse   daughter   son     Problem: Adjustment to Illness (Stroke, Hemorrhagic)  Goal: Optimal Coping  Intervention: Support Psychosocial Response to Stroke  Flowsheets (Taken 10/21/2023 0332)  Supportive Measures:   active listening utilized   positive reinforcement provided   relaxation techniques promoted  Family/Support System Care: support provided     Problem: Cerebral Tissue Perfusion (Stroke, Hemorrhagic)  Goal: Optimal Cerebral Tissue Perfusion  Intervention: Protect and Optimize Cerebral Perfusion  Flowsheets (Taken 10/21/2023 0332)  Sensory Stimulation Regulation:   care clustered   lighting decreased   visual stimulation minimized  Cerebral Perfusion Promotion: blood pressure monitored  Fluid/Electrolyte Management: fluids provided  Stabilization Measures: airway opened

## 2023-10-21 NOTE — PLAN OF CARE
Recommendations     1) Continue regular diet   2) If intake <50% add boost plus BID   3) Following     Goals: meet % een/epn by next rd f/u  Nutrition Goal Status: new  Communication of RD Recs: other (comment) (POC)

## 2023-10-21 NOTE — ASSESSMENT & PLAN NOTE
Patient with 2-day sudden expressive aphasia without weakness or any other major neurological symptom. CTH on admission suggestive of L ICH.    Plan   - Admit to NCC  - NPO   - DDAVP once as per nsg  - cardene gtt ordered from ED  - Keppra 1g followed by 500 BID   - platelets for reversal as per nsg  - hold aspirin   - pending repeat neuroimages (CT, MRI)   - SBP < 140  - Q1 neurochecks   - Vascular Neurology consulted  - PT/OT/SLP   - ok to step down to vascular neurology

## 2023-10-22 VITALS
RESPIRATION RATE: 16 BRPM | HEART RATE: 67 BPM | OXYGEN SATURATION: 98 % | SYSTOLIC BLOOD PRESSURE: 144 MMHG | DIASTOLIC BLOOD PRESSURE: 68 MMHG | HEIGHT: 63 IN | BODY MASS INDEX: 21.79 KG/M2 | WEIGHT: 123 LBS | TEMPERATURE: 98 F

## 2023-10-22 LAB
ALBUMIN SERPL BCP-MCNC: 3 G/DL (ref 3.5–5.2)
ALBUMIN SERPL BCP-MCNC: 3 G/DL (ref 3.5–5.2)
ALP SERPL-CCNC: 65 U/L (ref 55–135)
ALP SERPL-CCNC: 65 U/L (ref 55–135)
ALT SERPL W/O P-5'-P-CCNC: 10 U/L (ref 10–44)
ALT SERPL W/O P-5'-P-CCNC: 10 U/L (ref 10–44)
ANION GAP SERPL CALC-SCNC: 7 MMOL/L (ref 8–16)
ANION GAP SERPL CALC-SCNC: 7 MMOL/L (ref 8–16)
AST SERPL-CCNC: 22 U/L (ref 10–40)
AST SERPL-CCNC: 22 U/L (ref 10–40)
BASOPHILS # BLD AUTO: 0.02 K/UL (ref 0–0.2)
BASOPHILS # BLD AUTO: 0.02 K/UL (ref 0–0.2)
BASOPHILS NFR BLD: 0.3 % (ref 0–1.9)
BASOPHILS NFR BLD: 0.3 % (ref 0–1.9)
BILIRUB SERPL-MCNC: 0.8 MG/DL (ref 0.1–1)
BILIRUB SERPL-MCNC: 0.8 MG/DL (ref 0.1–1)
BUN SERPL-MCNC: 14 MG/DL (ref 8–23)
BUN SERPL-MCNC: 14 MG/DL (ref 8–23)
CALCIUM SERPL-MCNC: 8.3 MG/DL (ref 8.7–10.5)
CALCIUM SERPL-MCNC: 8.3 MG/DL (ref 8.7–10.5)
CHLORIDE SERPL-SCNC: 107 MMOL/L (ref 95–110)
CHLORIDE SERPL-SCNC: 107 MMOL/L (ref 95–110)
CO2 SERPL-SCNC: 21 MMOL/L (ref 23–29)
CO2 SERPL-SCNC: 21 MMOL/L (ref 23–29)
CREAT SERPL-MCNC: 0.7 MG/DL (ref 0.5–1.4)
CREAT SERPL-MCNC: 0.7 MG/DL (ref 0.5–1.4)
DIFFERENTIAL METHOD: ABNORMAL
DIFFERENTIAL METHOD: ABNORMAL
EOSINOPHIL # BLD AUTO: 0.1 K/UL (ref 0–0.5)
EOSINOPHIL # BLD AUTO: 0.1 K/UL (ref 0–0.5)
EOSINOPHIL NFR BLD: 1.8 % (ref 0–8)
EOSINOPHIL NFR BLD: 1.8 % (ref 0–8)
ERYTHROCYTE [DISTWIDTH] IN BLOOD BY AUTOMATED COUNT: 13.4 % (ref 11.5–14.5)
ERYTHROCYTE [DISTWIDTH] IN BLOOD BY AUTOMATED COUNT: 13.4 % (ref 11.5–14.5)
EST. GFR  (NO RACE VARIABLE): >60 ML/MIN/1.73 M^2
EST. GFR  (NO RACE VARIABLE): >60 ML/MIN/1.73 M^2
GLUCOSE SERPL-MCNC: 93 MG/DL (ref 70–110)
GLUCOSE SERPL-MCNC: 93 MG/DL (ref 70–110)
HCT VFR BLD AUTO: 33.9 % (ref 37–48.5)
HCT VFR BLD AUTO: 33.9 % (ref 37–48.5)
HGB BLD-MCNC: 11.2 G/DL (ref 12–16)
HGB BLD-MCNC: 11.2 G/DL (ref 12–16)
IMM GRANULOCYTES # BLD AUTO: 0.01 K/UL (ref 0–0.04)
IMM GRANULOCYTES # BLD AUTO: 0.01 K/UL (ref 0–0.04)
IMM GRANULOCYTES NFR BLD AUTO: 0.2 % (ref 0–0.5)
IMM GRANULOCYTES NFR BLD AUTO: 0.2 % (ref 0–0.5)
LYMPHOCYTES # BLD AUTO: 2.3 K/UL (ref 1–4.8)
LYMPHOCYTES # BLD AUTO: 2.3 K/UL (ref 1–4.8)
LYMPHOCYTES NFR BLD: 36.8 % (ref 18–48)
LYMPHOCYTES NFR BLD: 36.8 % (ref 18–48)
MAGNESIUM SERPL-MCNC: 2 MG/DL (ref 1.6–2.6)
MCH RBC QN AUTO: 29.8 PG (ref 27–31)
MCH RBC QN AUTO: 29.8 PG (ref 27–31)
MCHC RBC AUTO-ENTMCNC: 33 G/DL (ref 32–36)
MCHC RBC AUTO-ENTMCNC: 33 G/DL (ref 32–36)
MCV RBC AUTO: 90 FL (ref 82–98)
MCV RBC AUTO: 90 FL (ref 82–98)
MONOCYTES # BLD AUTO: 0.4 K/UL (ref 0.3–1)
MONOCYTES # BLD AUTO: 0.4 K/UL (ref 0.3–1)
MONOCYTES NFR BLD: 6.5 % (ref 4–15)
MONOCYTES NFR BLD: 6.5 % (ref 4–15)
NEUTROPHILS # BLD AUTO: 3.4 K/UL (ref 1.8–7.7)
NEUTROPHILS # BLD AUTO: 3.4 K/UL (ref 1.8–7.7)
NEUTROPHILS NFR BLD: 54.4 % (ref 38–73)
NEUTROPHILS NFR BLD: 54.4 % (ref 38–73)
NRBC BLD-RTO: 0 /100 WBC
NRBC BLD-RTO: 0 /100 WBC
PHOSPHATE SERPL-MCNC: 2.2 MG/DL (ref 2.7–4.5)
PLATELET # BLD AUTO: 139 K/UL (ref 150–450)
PLATELET # BLD AUTO: 139 K/UL (ref 150–450)
PMV BLD AUTO: 10.3 FL (ref 9.2–12.9)
PMV BLD AUTO: 10.3 FL (ref 9.2–12.9)
POCT GLUCOSE: 91 MG/DL (ref 70–110)
POTASSIUM SERPL-SCNC: 4 MMOL/L (ref 3.5–5.1)
POTASSIUM SERPL-SCNC: 4 MMOL/L (ref 3.5–5.1)
PROT SERPL-MCNC: 6.2 G/DL (ref 6–8.4)
PROT SERPL-MCNC: 6.2 G/DL (ref 6–8.4)
RBC # BLD AUTO: 3.76 M/UL (ref 4–5.4)
RBC # BLD AUTO: 3.76 M/UL (ref 4–5.4)
SODIUM SERPL-SCNC: 135 MMOL/L (ref 136–145)
SODIUM SERPL-SCNC: 135 MMOL/L (ref 136–145)
WBC # BLD AUTO: 6.17 K/UL (ref 3.9–12.7)
WBC # BLD AUTO: 6.17 K/UL (ref 3.9–12.7)

## 2023-10-22 PROCEDURE — 36415 COLL VENOUS BLD VENIPUNCTURE: CPT

## 2023-10-22 PROCEDURE — 99238 HOSP IP/OBS DSCHRG MGMT 30/<: CPT | Mod: GC,,, | Performed by: PSYCHIATRY & NEUROLOGY

## 2023-10-22 PROCEDURE — 80053 COMPREHEN METABOLIC PANEL: CPT

## 2023-10-22 PROCEDURE — 99238 PR HOSPITAL DISCHARGE DAY,<30 MIN: ICD-10-PCS | Mod: GC,,, | Performed by: PSYCHIATRY & NEUROLOGY

## 2023-10-22 PROCEDURE — 83735 ASSAY OF MAGNESIUM: CPT

## 2023-10-22 PROCEDURE — 85025 COMPLETE CBC W/AUTO DIFF WBC: CPT

## 2023-10-22 PROCEDURE — 84100 ASSAY OF PHOSPHORUS: CPT

## 2023-10-22 NOTE — DISCHARGE SUMMARY
[facility] - Neurosurgery ([facility])  Vascular Neurology  Comprehensive Stroke Center  Discharge Summary     Summary:     Admit Date: 10/20/2023  1:25 PM    Discharge Date and Time:  10/22/2023 3:54 PM    Attending Physician: No att. providers found     Discharge Provider: German Fuentes DO    History of Present Illness: Jody Muñoz is a 73 y.o. female with PMH of hypothyroidism, on daily ASA for unclear reason that presents as a transfer from [place] for higher level care of L temporal ICH. Patient initially presented to OSH with progressively worsening expressive aphasia x 2 days as well as pressure-like HA. Denies associated N/V, vision changes, extremity weakness, gait instability. CTH @ OSH with L temporal IPH vs mass with surrounding edema. At [facility], neuro exam significant for expressive aphasia with NIHSS 2. NSGY consulted, no acute surgical intervention at this time and recommended DDAVP/Plts for reversal of ASA. Patient will be admitted to [facility] for close monitoring and observation.      Hospital Course (synopsis of major diagnoses, care, treatment, and services provided during the course of the hospital stay): 10/21 BP at goal, neuro exam stable, imaging suggestive of CAA but plans to confirm with SWI, patient pending step down, discussed stroke POC with patient and family    Patient admitted with L temporal ICH after presenting to OSH with progressively worsening expressive aphasia for 2 days and pressure like headaches. CTH revealed evidence of L temporal ICH. Patient transferred to [facility] for higher level of care. SWI MRI showing evolving but stable temporal hemorrage and additional SAH in the posterior frontal sulci at the vertex. Findings are concerning for potential amyloid angiopathy. NSGY was consulted and recommended no acute surgical intervention. Plts given for reversal of patient's home ASA. Patient will need a repeat SWI MRI in 6-8 weeks and will follow up with vascular neurology in 4-6  weeks.      Goals of Care Treatment Preferences:  Code Status: Full Code      Stroke Etiology: Hemorrhage ICH Lobar Amyloid Angiopathy    STROKE DOCUMENTATION         NIH Scale:  1a. Level of Consciousness: 0-->Alert, keenly responsive  1b. LOC Questions: 0-->Answers both questions correctly  1c. LOC Commands: 0-->Performs both tasks correctly  2. Best Gaze: 0-->Normal  3. Visual: 0-->No visual loss  4. Facial Palsy: 0-->Normal symmetrical movements  5a. Motor Arm, Left: 0-->No drift, limb holds 90 (or 45) degrees for full 10 secs  5b. Motor Arm, Right: 0-->No drift, limb holds 90 (or 45) degrees for full 10 secs  6a. Motor Leg, Left: 0-->No drift, leg holds 30 degree position for full 5 secs  6b. Motor Leg, Right: 0-->No drift, leg holds 30 degree position for full 5 secs  7. Limb Ataxia: 0-->Absent  8. Sensory: 0-->Normal, no sensory loss  9. Best Language: 1-->Mild-to-moderate aphasia, some obvious loss of fluency or facility of comprehension, without significant limitation on ideas expressed or form of expression. Reduction of speech and/or comprehension, however, makes conversation. . . (see row details)  10. Dysarthria: 0-->Normal  11. Extinction and Inattention (formerly Neglect): 0-->No abnormality  Total (NIH Stroke Scale): 1        Modified Flower Score: 0  Ron Coma Scale:    ABCD2 Score:    [id number] Score:   HAS -BLED Score:   ICH Score:0  Hunt & Grace Classification:       Assessment/Plan:     Diagnostic Results      Brain Imaging   MRI SWI  Evolving left posterior temporal parenchymal hemorrhage with small subarachnoid and trace subdural extension.     Additional trace subarachnoid hemorrhage posterior frontal sulci at the vertex.     No significant new hemorrhage.  Clinical correlation and follow-up advised     MRI Brain W/WO 10/21/2023  Evidence of convexal SAH suggestive of possible underlying CAA        CTH without contrast 10/20/2023 @ OSH  Impression:  Findings within the posterior aspect  of the left temporal lobe most consistent with intraparenchymal hemorrhage with surrounding edema.  No midline shift.  This finding is located in the periphery of the cerebrum.  Sequela of a venous infarct is considered in the differential.  Underlying mass not excluded.     Vessel Imaging   CTA multiphase 10/20/23  Impression:     Stable left temporal lobe intraparenchymal hemorrhage with minimal mass effect.  No detrimental interval change.     No significant abnormality of major cervical and head arteries.     Nodular left thyroid gland with heterogeneous enhancement.  Recommend thyroid ultrasound for further evaluation.     Bilateral upper lobe several scattered pulmonary micro nodules ranging 2-4 mm.  For multiple solid nodules all <6 mm, Fleischner Society 2017 guidelines recommend no routine follow up for a low risk patient, or follow up with non-contrast chest CT at 12 months after discovery in a high risk patient.       Interventions: None    Complications: None    Disposition: Home or Self Care    Final Active Diagnoses:    Diagnosis Date Noted POA    PRINCIPAL PROBLEM:  Nontraumatic cortical hemorrhage of left cerebral hemisphere [I61.1] 10/20/2023 Yes    Pulmonary nodules [R91.8] 10/21/2023 Unknown    Temporal lobe lesion [G93.9] 10/20/2023 Unknown    Vasogenic cerebral edema [G93.6] 10/20/2023 Yes      Problems Resolved During this Admission:     Neuro  * Nontraumatic cortical hemorrhage of left cerebral hemisphere  Jody Muñoz is a 73 y.o. female with PMH of hypothyroidism, on daily ASA for unclear reason that presents as a transfer from [place] for higher level care of L temporal ICH. Patient initially presented with progressively worsening expressive aphasia x 2 days as well as pressure-like HA. CTH revealed L temporal IPH with surrounding edema, underlying mass not excluded. At [facility] neuro exam significant for expressive aphasia with NIHSS 2.     NSGY consulted, no acute surgical  intervention required, patient received DDAVP/Plts for reversal of ASA. Follow up MRI W/WO with evidence of convexal SAH suggestive of possible underlying CAA. SWI MRI confirmed additional trace SAH in posterior frontal sulci. Findings are concerning for possible cerebral amyloid angiopathy.      Antithrombotics for secondary stroke prevention: Antiplatelets: None: Intracerebral Hemorrhage    Statins for secondary stroke prevention: Statins: None: Reason: not indicated in acute ICH    Aggressive risk factor modification: Diet, None     Rehab efforts: The patient has been evaluated by a stroke team provider and the therapy needs have been fully considered based off the presenting complaints and exam findings. The following therapy evaluations are needed: PT evaluate and treat, OT evaluate and treat, SLP evaluate and treat    Diagnostics ordered/pending: Other: MRI with SWI only    VTE prophylaxis: Mechanical prophylaxis: Place SCDs  None: Reason for No Pharmacological VTE Prophylaxis: ICH    BP parameters: ICH: SBP <140            Recommendations:     Post-discharge complication risks: None    Stroke Education given to: patient and family    Follow-up in Stroke Clinic in 4-6 weeks.     Discharge Plan:  Aggresive risk factor modification:  Hypertension    Follow Up:      Patient Instructions:      Ambulatory referral/consult to Vascular Neurology   Standing Status: Future   Referral Priority: Routine Referral Type: Consultation   Referral Reason: Specialty Services Required   Requested Specialty: Vascular Neurology   Number of Visits Requested: 1     Lifting restrictions     Notify your health care provider if you experience any of the following:  persistent nausea and vomiting or diarrhea     Notify your health care provider if you experience any of the following:  severe persistent headache     Notify your health care provider if you experience any of the following:  persistent dizziness, light-headedness, or visual  disturbances     Notify your health care provider if you experience any of the following:  increased confusion or weakness       Medications:  Reconciled Home Medications:      Medication List      CONTINUE taking these medications    levothyroxine 50 MCG tablet  Commonly known as: SYNTHROID  Take 50 mcg by mouth before breakfast.        STOP taking these medications    aspirin 81 MG EC tablet  Commonly known as: ADÁN Fuentes DO  [facility]  Department of Vascular Neurology   [facility] - Neurosurgery ([facility])

## 2023-10-22 NOTE — PLAN OF CARE
Problem: Adult Inpatient Plan of Care  Goal: Optimal Comfort and Wellbeing  Outcome: Ongoing, Progressing  Intervention: Monitor Pain and Promote Comfort  Flowsheets (Taken 10/22/2023 0501)  Pain Management Interventions:   care clustered   position adjusted   pillow support provided   pain management plan reviewed with patient/caregiver   quiet environment facilitated     Problem: Cerebral Tissue Perfusion (Stroke, Hemorrhagic)  Goal: Optimal Cerebral Tissue Perfusion  Outcome: Ongoing, Progressing  Intervention: Protect and Optimize Cerebral Perfusion  Flowsheets (Taken 10/22/2023 0501)  Sensory Stimulation Regulation:   care clustered   lighting decreased   quiet environment promoted  Cerebral Perfusion Promotion: blood pressure monitored  Fluid/Electrolyte Management: fluids provided     Problem: Functional Ability Impaired (Stroke, Hemorrhagic)  Goal: Optimal Functional Ability  Outcome: Ongoing, Progressing  Intervention: Optimize Functional Ability  Flowsheets (Taken 10/22/2023 0501)  Self-Care Promotion: independence encouraged  Activity Management: Rolling - L1    Safety measures maintained, patient bed in low position, bed alarm set, and call bell in reach. Pt aox4 and willing to listen to education/POC. Pt BP goal -150 maintained. MRI completed and pending results.

## 2023-10-22 NOTE — ASSESSMENT & PLAN NOTE
Jody Muñoz is a 73 y.o. female with PMH of hypothyroidism, on daily ASA for unclear reason that presents as a transfer from [place] for higher level care of L temporal ICH. Patient initially presented with progressively worsening expressive aphasia x 2 days as well as pressure-like HA. CTH revealed L temporal IPH with surrounding edema, underlying mass not excluded. At [facility] neuro exam significant for expressive aphasia with NIHSS 2.     NSGY consulted, no acute surgical intervention required, patient received DDAVP/Plts for reversal of ASA. Follow up MRI W/WO with evidence of convexal SAH suggestive of possible underlying CAA. SWI MRI confirmed additional trace SAH in posterior frontal sulci. Findings are concerning for possible cerebral amyloid angiopathy.      Antithrombotics for secondary stroke prevention: Antiplatelets: None: Intracerebral Hemorrhage    Statins for secondary stroke prevention: Statins: None: Reason: not indicated in acute ICH    Aggressive risk factor modification: Diet, None     Rehab efforts: The patient has been evaluated by a stroke team provider and the therapy needs have been fully considered based off the presenting complaints and exam findings. The following therapy evaluations are needed: PT evaluate and treat, OT evaluate and treat, SLP evaluate and treat    Diagnostics ordered/pending: Other: MRI with SWI only    VTE prophylaxis: Mechanical prophylaxis: Place SCDs  None: Reason for No Pharmacological VTE Prophylaxis: ICH    BP parameters: ICH: SBP <140

## 2023-10-22 NOTE — NURSING
DISCHARGE NOTE    Pt discharged home with self.  at bedside to bring pt home. IV removed. Tele box returned to . AVS given & explained to pt and . Addressed all questions/concerns. Awaiting transport. GRIS Gomes

## 2023-10-25 ENCOUNTER — TELEPHONE (OUTPATIENT)
Dept: NEUROLOGY | Facility: HOSPITAL | Age: 73
End: 2023-10-25
Payer: MEDICARE

## 2023-10-25 NOTE — TELEPHONE ENCOUNTER
Attempted to reach patient regarding a stroke discharge followup call. Left a message with callback info.

## 2023-10-26 ENCOUNTER — TELEPHONE (OUTPATIENT)
Dept: NEUROLOGY | Facility: HOSPITAL | Age: 73
End: 2023-10-26
Payer: MEDICARE

## 2023-10-26 NOTE — PHYSICIAN QUERY
PT Name: Jody Muñoz  MR #: [id number]    DOCUMENTATION CLARIFICATION     CDS/: Hakan Roper RN, BSN   Contact information: [email or web address]      This form is a permanent document in the medical record.     Query Date: October 26, 2023    By submitting this query, we are merely seeking further clarification of documentation. Please utilize your independent clinical judgment when addressing the question(s) below.    The Medical Record contains the following:  Clinical Findings Location in Medical Record   Assessment/Plan:   Nontraumatic cortical hemorrhage of left cerebral hemisphere  presents as a transfer from [place] for higher level care of L temporal ICH. Patient initially presented with progressively worsening expressive aphasia x 2 days as well as pressure-like HA.    -- CTH revealed L temporal IPH with surrounding edema, underlying mass not excluded. At [facility] neuro exam significant for expressive aphasia with NIHSS 2.         Vasogenic cerebral edema  -Area of cerebral edema identified when reviewing brain imaging in the Left PCA territory, there is associated localized mass effect without midline shift  -Admitted to [facility]. NSGY consulted and following, no acute surgical intervention at this time.   -Frequent q1hr neuro checks as any acute changes or worsening neuro status may signify expansion of the insult and/or area of the edema, which may require acute interventions to prevent loss of function and/or death.  -Obtain stat CTH for any new or worsening neuro changes and notify primary team immediately                 Vasogenic cerebral edema  -Area of cerebral edema identified when reviewing brain imaging in the L temporal lobe  -minimal mass effect, no MLS  -exam and follow up imaging stable, patient stepping down to NPU, q4h neuro checks  -Obtain stat CTH for any new or worsening neuro changes and notify primary team immediately                       Final Active Diagnoses:      Diagnosis Date Noted POA    PRINCIPAL PROBLEM:  Nontraumatic cortical hemorrhage of left cerebral hemisphere [I61.1] 10/20/2023 Yes    Pulmonary nodules [R91.8] 10/21/2023 Unknown    Temporal lobe lesion [G93.9] 10/20/2023 Unknown    Vasogenic cerebral edema [G93.6] 10/20/2023 Yes           Vascular Neurology H&P 10/20/2023 (filed 10/21/2023)                                                      Vascular Neurology PN 10/21/2023                                     Vascular Neurology Discharge Summary 10/22/2023   CTH without contrast 10/20/2023 @ OSH  Impression:  Findings within the posterior aspect of the left temporal lobe most consistent with intraparenchymal hemorrhage with surrounding edema.  No midline shift.  This finding is located in the periphery of the cerebrum.  Sequela of a venous infarct is considered in the differential.  Underlying mass not excluded.        Vessel Imaging   CTA multiphase 10/20/23  Impression:     Stable left temporal lobe intraparenchymal hemorrhage with minimal mass effect.  No detrimental interval change.        Brain Imaging   MRI SWI  Evolving left posterior temporal parenchymal hemorrhage with small subarachnoid and trace subdural extension.     Additional trace subarachnoid hemorrhage posterior frontal sulci at the vertex.     No significant new hemorrhage.  Clinical correlation and follow-up advised     MRI Brain W/WO 10/21/2023  Evidence of convexal SAH suggestive of possible underlying CAA          Vascular Neurology Discharge Summary 10/22/2023     For reporting purposes, the definition for other diagnoses is interpreted as additional conditions that affect patient care in terms of requiring: clinical evaluation; or therapeutic treatment; or diagnostic procedures; or extended length of hospital stay; or increased nursing care and/or monitoring. (ICD-10-CM Official Guidelines for Coding and Reporting FY 2021)       After study, the diagnosis of cerebral edema is:    [ x  ]  Clinically significant diagnosis that was monitored and/or treated as follows   (please specify): ____close monitoring of BRAIN HEMORRHAGE EDEMA to make sure expansion did not contribute to neurowrsening_________________________________________     [   ] Present but was inherent to the stroke     [   ] Other explanation (please specify): __________________________________     [  ] Clinically Undetermined       Please document in your progress notes daily for the duration of treatment until resolved, and include in your discharge summary.    Reference:  ICD-10-CM Official Guidelines for Coding and Reporting FY 2021. (2020). Retrieved April 7, 2021, from https://www.cdc.gov/nchs/data/icd/10cmguidelines-FY2021.pdf?fbclid=[email or web address]       Form No. [id number]

## 2023-10-27 ENCOUNTER — TELEPHONE (OUTPATIENT)
Dept: NEUROLOGY | Facility: HOSPITAL | Age: 73
End: 2023-10-27
Payer: MEDICARE

## 2023-10-27 NOTE — TELEPHONE ENCOUNTER
Attempted to reach patient regarding a stroke discharge followup call. Left message with callback info.

## 2023-12-14 ENCOUNTER — TELEPHONE (OUTPATIENT)
Dept: NEUROLOGY | Facility: CLINIC | Age: 73
End: 2023-12-14
Payer: MEDICARE

## 2023-12-14 DIAGNOSIS — I68.0 CEREBRAL AMYLOID ANGIOPATHY: ICD-10-CM

## 2023-12-14 DIAGNOSIS — I62.9 INTRACRANIAL HEMORRHAGE: Primary | ICD-10-CM

## 2023-12-14 DIAGNOSIS — E85.4 CEREBRAL AMYLOID ANGIOPATHY: ICD-10-CM

## 2023-12-14 NOTE — TELEPHONE ENCOUNTER
----- Message from Jazz Chang sent at 12/14/2023 10:24 AM CST -----  Regarding: FW: appt  Contact: [phone]  Dr Bee office called to schedule pt after speaking with her, and they were in the process of notifying her.    Thanks      ----- Message -----  From: Any Negrete  Sent: 12/14/2023  10:13 AM CST  To: Jazz Chang; [facility] Staff  Subject: appt                                             Pt is confused as to why would an appt be made without anyone notifying her. Pt would like to speak to someone in regards to that. Pls call to discuss.

## 2023-12-14 NOTE — TELEPHONE ENCOUNTER
----- Message from Ramona Worley MA sent at 12/14/2023  3:41 PM CST -----  Contact: Abe bee  This is the MA helping out the box today. There's no MRI openings on the 19th, they can schedule an MRI at [facility] if distance is an issue since [facility] Please advise, thanks.     ----- Message -----  From: Jazz Chang  Sent: 12/14/2023   3:25 PM CST  To: Sin Verma Staff    12/14/23    PT appt 12/19/23-MRI order created in [facility], no soon appts- DR Bee in requesting MRI on the same day if possible, because pt will be driving from [place].      No soon appts available    Thanks    Jazz MORAN

## 2023-12-19 ENCOUNTER — OFFICE VISIT (OUTPATIENT)
Dept: NEUROLOGY | Facility: CLINIC | Age: 73
End: 2023-12-19
Payer: MEDICARE

## 2023-12-19 VITALS
BODY MASS INDEX: 21.79 KG/M2 | WEIGHT: 123 LBS | SYSTOLIC BLOOD PRESSURE: 130 MMHG | DIASTOLIC BLOOD PRESSURE: 78 MMHG | HEIGHT: 63 IN

## 2023-12-19 DIAGNOSIS — I61.2 NONTRAUMATIC HEMORRHAGE OF LEFT CEREBRAL HEMISPHERE: ICD-10-CM

## 2023-12-19 PROCEDURE — 99213 OFFICE O/P EST LOW 20 MIN: CPT | Mod: S$PBB,,, | Performed by: PSYCHIATRY & NEUROLOGY

## 2023-12-19 PROCEDURE — 99999 PR PBB SHADOW E&M-EST. PATIENT-LVL III: CPT | Mod: PBBFAC,,, | Performed by: PSYCHIATRY & NEUROLOGY

## 2023-12-19 PROCEDURE — 99999 PR PBB SHADOW E&M-EST. PATIENT-LVL III: ICD-10-PCS | Mod: PBBFAC,,, | Performed by: PSYCHIATRY & NEUROLOGY

## 2023-12-19 PROCEDURE — 99213 PR OFFICE/OUTPT VISIT, EST, LEVL III, 20-29 MIN: ICD-10-PCS | Mod: S$PBB,,, | Performed by: PSYCHIATRY & NEUROLOGY

## 2023-12-19 PROCEDURE — 99213 OFFICE O/P EST LOW 20 MIN: CPT | Mod: PBBFAC | Performed by: PSYCHIATRY & NEUROLOGY

## 2023-12-19 NOTE — PROGRESS NOTES
"  Jody Muñoz is a 73 y.o. year old female that  presents for stroke follow up. She is accompanied by her .    HPI:  Mrs Muñoz has thyroid disease and L temporal ICH.  As per review of her medical records, she " presented as a transfer from [place] for higher level care of L temporal ICH. Patient initially presented to OSH with progressively worsening expressive aphasia x 2 days as well as pressure-like HA. Denies associated N/V, vision changes, extremity weakness, gait instability. CTH @ OSH with L temporal IPH vs mass with surrounding edema. At C, neuro exam significant for expressive aphasia with NIHSS 2. NSGY consulted, no acute surgical intervention at this time and recommended DDAVP/Plts for reversal of ASA. Patient will be admitted to [facility] for close monitoring and observation ".  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  She reports no residual neurological deficits from that insult last October and indicated that she is doing well, without recurrence of stroke like symptoms, spells suggestive of seizures, or post stroke cognitive changes.  Denies HA, vertigo, double vision, imbalance, falls, tremors, focal weakness or numbness, slurred speech, language or visual impairment.  No taking ASA anymore.  A follow up MRI brain is already scheduled for 1/4/24.     Past Medical History:   Diagnosis Date    Temporal lobe lesion 10/20/2023    Thyroid disease      Social History     Socioeconomic History    Marital status:    Tobacco Use    Smoking status: Never    Smokeless tobacco: Never   Substance and Sexual Activity    Alcohol use: Not Currently    Drug use: Never     Past Surgical History:   Procedure Laterality Date    C SECTION      LAPAROSCOPIC APPENDECTOMY N/A 11/9/2021    Procedure: APPENDECTOMY, LAPAROSCOPIC;  Surgeon: Donald James MD;  Location: [facility] OR;  Service: General;  Laterality: N/A;     No family history on file.        Review of Systems  General ROS: " "negative for chills, fever or weight loss  Psychological ROS: negative for hallucination, depression or suicidal ideation  Ophthalmic ROS: negative for blurry vision, photophobia or eye pain  ENT ROS: negative for epistaxis, sore throat or rhinorrhea  Respiratory ROS: no cough, shortness of breath, or wheezing  Cardiovascular ROS: no chest pain or dyspnea on exertion  Gastrointestinal ROS: no abdominal pain, change in bowel habits, or black/ bloody stools  Genito-Urinary ROS: no dysuria, trouble voiding, or hematuria  Musculoskeletal ROS: negative for gait disturbance or muscular weakness  Neurological ROS: no syncope or seizures; no ataxia  Dermatological ROS: negative for pruritis, rash and jaundice      Physical Exam:  /78   Pulse (P) 77   Ht 5' 3" (1.6 m)   Wt 55.8 kg (123 lb 0.3 oz)   BMI 21.79 kg/m²   General appearance: alert, cooperative, no distress  Constitutional:Oriented to person, place, and time.appears well-developed and well-nourished.   HEENT: Normocephalic, atraumatic, neck symmetrical, no nasal discharge   Eyes: conjunctivae/corneas clear, PERRL, EOM's intact  Lungs: clear to auscultation bilaterally, no dullness to percussion bilaterally  Heart: regular rate and rhythm without rub; no displacement of the PMI   Abdomen: soft, non-tender; bowel sounds normoactive; no organomegaly  Extremities: extremities symmetric; no clubbing, cyanosis, or edema  Integument: Skin color, texture, turgor normal; no rashes; hair distrubution normal  Neurologic:   Mental status: alert and awake, oriented x 4, comprehension, naming, and repetition intact. No right to left confusion. Performs serial 7's without difficulty .No dysarthria.  CN 2-12: pupils 4 mm bilaterally, reactive to light. Fundi without papilledema. Visual fields full to confrontation. EOM full without nystagmus. Face sensation normal in all distributions. Face symmetric. Hearing grossly intact. Palate elevates well. Tongue midline without " atrophy or fasciculations.  Motor: 5/5 all over  Sensory: intact in all modalities.  DTR's: 2+ all over.  Plantars: no tested.  Coordination: finger to nose and heel-knee-shin intact.  Gait: no ataxia or bradykinesia     LABS:    Complete Blood Count  Lab Results   Component Value Date    RBC 3.76 (L) 10/22/2023    RBC 3.76 (L) 10/22/2023    HGB 11.2 (L) 10/22/2023    HGB 11.2 (L) 10/22/2023    HCT 33.9 (L) 10/22/2023    HCT 33.9 (L) 10/22/2023    MCV 90 10/22/2023    MCV 90 10/22/2023    MCH 29.8 10/22/2023    MCH 29.8 10/22/2023    MCHC 33.0 10/22/2023    MCHC 33.0 10/22/2023    RDW 13.4 10/22/2023    RDW 13.4 10/22/2023     (L) 10/22/2023     (L) 10/22/2023    MPV 10.3 10/22/2023    MPV 10.3 10/22/2023    GRAN 3.4 10/22/2023    GRAN 54.4 10/22/2023    GRAN 3.4 10/22/2023    GRAN 54.4 10/22/2023    LYMPH 2.3 10/22/2023    LYMPH 36.8 10/22/2023    LYMPH 2.3 10/22/2023    LYMPH 36.8 10/22/2023    MONO 0.4 10/22/2023    MONO 6.5 10/22/2023    MONO 0.4 10/22/2023    MONO 6.5 10/22/2023    EOS 0.1 10/22/2023    EOS 0.1 10/22/2023    BASO 0.02 10/22/2023    BASO 0.02 10/22/2023    EOSINOPHIL 1.8 10/22/2023    EOSINOPHIL 1.8 10/22/2023    BASOPHIL 0.3 10/22/2023    BASOPHIL 0.3 10/22/2023    DIFFMETHOD Automated 10/22/2023    DIFFMETHOD Automated 10/22/2023       Comprehensive Metabolic Panel  Lab Results   Component Value Date    GLU 93 10/22/2023    GLU 93 10/22/2023    BUN 14 10/22/2023    BUN 14 10/22/2023    CREATININE 0.7 10/22/2023    CREATININE 0.7 10/22/2023     (L) 10/22/2023     (L) 10/22/2023    K 4.0 10/22/2023    K 4.0 10/22/2023     10/22/2023     10/22/2023    PROT 6.2 10/22/2023    PROT 6.2 10/22/2023    ALBUMIN 3.0 (L) 10/22/2023    ALBUMIN 3.0 (L) 10/22/2023    BILITOT 0.8 10/22/2023    BILITOT 0.8 10/22/2023    AST 22 10/22/2023    AST 22 10/22/2023    ALKPHOS 65 10/22/2023    ALKPHOS 65 10/22/2023    CO2 21 (L) 10/22/2023    CO2 21 (L) 10/22/2023    ALT 10  10/22/2023    ALT 10 10/22/2023    ANIONGAP 7 (L) 10/22/2023    ANIONGAP 7 (L) 10/22/2023    EGFRNONAA >60 11/09/2021    ESTGFRAFRICA >60 11/09/2021       TSH  Lab Results   Component Value Date    TSH 1.671 10/20/2023    TSH 1.671 10/20/2023         Assessment: pleasant 74 y/o with thyroid disease and lobar L temporal ICH with associated findings on MRI-SWI 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  Doing well from a neurological standpoint, NIHSS 0, modified [place] scale 0.  Had and ample conversation with patient and  regarding the diagnosis, lack of specific treatment, and prognosis of probable CAA.  Recommended avoiding antiplatelets, anticoagulants, preventing falls, head trauma, and keeping BP properly controlled.  Will contact her as soon as MRI brain results become available.            ICD-10-CM ICD-9-CM    1. Nontraumatic hemorrhage of left cerebral hemisphere  I61.2 431 Ambulatory referral/consult to Vascular Neurology        The encounter diagnosis was Nontraumatic hemorrhage of left cerebral hemisphere.      Plan:  1) Lobar L temporal ICH  2) Hypothyroidism  No orders of the defined types were placed in this encounter.          Bayron Vogt MD

## 2023-12-27 NOTE — TELEPHONE ENCOUNTER
Is there anything from us that needs to be done for this patient? It looks like the MRI is already scheduled for 1/4/24

## 2024-01-04 ENCOUNTER — HOSPITAL ENCOUNTER (OUTPATIENT)
Dept: RADIOLOGY | Facility: HOSPITAL | Age: 74
Discharge: HOME | End: 2024-01-04
Attending: FAMILY MEDICINE
Payer: MEDICARE

## 2024-01-04 DIAGNOSIS — I62.9 INTRACRANIAL HEMORRHAGE: ICD-10-CM

## 2024-01-04 DIAGNOSIS — I68.0 CEREBRAL AMYLOID ANGIOPATHY: ICD-10-CM

## 2024-01-04 DIAGNOSIS — E85.4 CEREBRAL AMYLOID ANGIOPATHY: ICD-10-CM

## 2024-01-04 PROCEDURE — 70551 MRI BRAIN STEM W/O DYE: CPT | Mod: TC

## 2024-01-04 PROCEDURE — 70551 MRI BRAIN STEM W/O DYE: CPT | Mod: 26,,, | Performed by: RADIOLOGY

## 2024-01-25 ENCOUNTER — DOCUMENTATION ONLY (OUTPATIENT)
Dept: NEUROLOGY | Facility: HOSPITAL | Age: 74
End: 2024-01-25
Payer: COMMERCIAL

## 2024-07-23 DIAGNOSIS — I61.1 NONTRAUMATIC CORTICAL HEMORRHAGE OF CEREBRAL HEMISPHERE, UNSPECIFIED LATERALITY: Primary | ICD-10-CM

## 2024-10-08 ENCOUNTER — PATIENT OUTREACH (OUTPATIENT)
Dept: ADMINISTRATIVE | Facility: OTHER | Age: 74
End: 2024-10-08
Payer: COMMERCIAL

## 2024-10-08 VITALS — SYSTOLIC BLOOD PRESSURE: 108 MMHG | HEART RATE: 82 BPM | OXYGEN SATURATION: 98 % | DIASTOLIC BLOOD PRESSURE: 58 MMHG

## 2024-11-08 ENCOUNTER — TELEPHONE (OUTPATIENT)
Dept: NEUROLOGY | Facility: CLINIC | Age: 74
End: 2024-11-08
Payer: COMMERCIAL

## 2024-11-12 ENCOUNTER — PATIENT OUTREACH (OUTPATIENT)
Dept: ADMINISTRATIVE | Facility: OTHER | Age: 74
End: 2024-11-12
Payer: COMMERCIAL

## 2024-11-12 VITALS — OXYGEN SATURATION: 98 % | SYSTOLIC BLOOD PRESSURE: 108 MMHG | DIASTOLIC BLOOD PRESSURE: 72 MMHG | HEART RATE: 98 BPM

## 2024-12-10 ENCOUNTER — PATIENT OUTREACH (OUTPATIENT)
Dept: ADMINISTRATIVE | Facility: OTHER | Age: 74
End: 2024-12-10
Payer: COMMERCIAL

## 2024-12-12 VITALS — DIASTOLIC BLOOD PRESSURE: 74 MMHG | HEART RATE: 83 BPM | OXYGEN SATURATION: 98 % | SYSTOLIC BLOOD PRESSURE: 118 MMHG

## 2025-01-14 ENCOUNTER — PATIENT OUTREACH (OUTPATIENT)
Dept: ADMINISTRATIVE | Facility: OTHER | Age: 75
End: 2025-01-14
Payer: COMMERCIAL

## 2025-01-17 VITALS — DIASTOLIC BLOOD PRESSURE: 76 MMHG | OXYGEN SATURATION: 100 % | SYSTOLIC BLOOD PRESSURE: 112 MMHG | HEART RATE: 85 BPM

## 2025-02-11 ENCOUNTER — PATIENT OUTREACH (OUTPATIENT)
Dept: ADMINISTRATIVE | Facility: OTHER | Age: 75
End: 2025-02-11
Payer: COMMERCIAL

## 2025-02-11 VITALS — HEART RATE: 83 BPM | OXYGEN SATURATION: 97 % | DIASTOLIC BLOOD PRESSURE: 66 MMHG | SYSTOLIC BLOOD PRESSURE: 118 MMHG

## 2025-04-08 ENCOUNTER — PATIENT OUTREACH (OUTPATIENT)
Dept: ADMINISTRATIVE | Facility: OTHER | Age: 75
End: 2025-04-08
Payer: COMMERCIAL

## 2025-04-09 VITALS — SYSTOLIC BLOOD PRESSURE: 124 MMHG | HEART RATE: 84 BPM | OXYGEN SATURATION: 98 % | DIASTOLIC BLOOD PRESSURE: 70 MMHG

## 2025-05-13 ENCOUNTER — PATIENT OUTREACH (OUTPATIENT)
Dept: ADMINISTRATIVE | Facility: OTHER | Age: 75
End: 2025-05-13
Payer: COMMERCIAL

## 2025-05-13 VITALS — HEART RATE: 88 BPM | DIASTOLIC BLOOD PRESSURE: 62 MMHG | OXYGEN SATURATION: 98 % | SYSTOLIC BLOOD PRESSURE: 112 MMHG

## 2025-06-10 ENCOUNTER — PATIENT OUTREACH (OUTPATIENT)
Dept: ADMINISTRATIVE | Facility: OTHER | Age: 75
End: 2025-06-10
Payer: COMMERCIAL

## 2025-06-10 VITALS — HEART RATE: 82 BPM | DIASTOLIC BLOOD PRESSURE: 70 MMHG | SYSTOLIC BLOOD PRESSURE: 120 MMHG | OXYGEN SATURATION: 98 %

## 2025-07-08 ENCOUNTER — PATIENT OUTREACH (OUTPATIENT)
Dept: ADMINISTRATIVE | Facility: OTHER | Age: 75
End: 2025-07-08
Payer: COMMERCIAL

## 2025-07-08 VITALS — DIASTOLIC BLOOD PRESSURE: 70 MMHG | HEART RATE: 88 BPM | OXYGEN SATURATION: 98 % | SYSTOLIC BLOOD PRESSURE: 116 MMHG

## 2025-08-12 ENCOUNTER — PATIENT OUTREACH (OUTPATIENT)
Dept: ADMINISTRATIVE | Facility: OTHER | Age: 75
End: 2025-08-12
Payer: COMMERCIAL

## 2025-08-13 VITALS — SYSTOLIC BLOOD PRESSURE: 118 MMHG | DIASTOLIC BLOOD PRESSURE: 68 MMHG | OXYGEN SATURATION: 98 % | HEART RATE: 77 BPM
